# Patient Record
Sex: FEMALE | Race: WHITE | NOT HISPANIC OR LATINO | Employment: OTHER | URBAN - METROPOLITAN AREA
[De-identification: names, ages, dates, MRNs, and addresses within clinical notes are randomized per-mention and may not be internally consistent; named-entity substitution may affect disease eponyms.]

---

## 2022-02-24 ENCOUNTER — APPOINTMENT (OUTPATIENT)
Dept: GENERAL RADIOLOGY | Facility: CLINIC | Age: 72
DRG: 516 | End: 2022-02-24
Attending: EMERGENCY MEDICINE
Payer: MEDICARE

## 2022-02-24 ENCOUNTER — APPOINTMENT (OUTPATIENT)
Dept: CT IMAGING | Facility: CLINIC | Age: 72
DRG: 516 | End: 2022-02-24
Attending: EMERGENCY MEDICINE
Payer: MEDICARE

## 2022-02-24 ENCOUNTER — HOSPITAL ENCOUNTER (INPATIENT)
Facility: CLINIC | Age: 72
LOS: 4 days | Discharge: HOME HEALTH | DRG: 516 | End: 2022-02-28
Attending: EMERGENCY MEDICINE | Admitting: INTERNAL MEDICINE
Payer: MEDICARE

## 2022-02-24 DIAGNOSIS — S82.001A CLOSED DISPLACED FRACTURE OF RIGHT PATELLA, UNSPECIFIED FRACTURE MORPHOLOGY, INITIAL ENCOUNTER: ICD-10-CM
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
## 2022-02-24 DIAGNOSIS — S82.031A CLOSED DISPLACED TRANSVERSE FRACTURE OF RIGHT PATELLA, INITIAL ENCOUNTER: Primary | ICD-10-CM

## 2022-02-24 DIAGNOSIS — S43.004A SHOULDER DISLOCATION, RIGHT, INITIAL ENCOUNTER: ICD-10-CM

## 2022-02-24 DIAGNOSIS — S09.90XA CLOSED HEAD INJURY, INITIAL ENCOUNTER: ICD-10-CM

## 2022-02-24 LAB
ANION GAP SERPL CALCULATED.3IONS-SCNC: 5 MMOL/L (ref 3–14)
BASOPHILS # BLD AUTO: 0.1 10E3/UL (ref 0–0.2)
BASOPHILS NFR BLD AUTO: 0 %
BUN SERPL-MCNC: 18 MG/DL (ref 7–30)
CALCIUM SERPL-MCNC: 8.3 MG/DL (ref 8.5–10.1)
CHLORIDE BLD-SCNC: 107 MMOL/L (ref 94–109)
CO2 SERPL-SCNC: 23 MMOL/L (ref 20–32)
CREAT SERPL-MCNC: 0.63 MG/DL (ref 0.52–1.04)
EOSINOPHIL # BLD AUTO: 0 10E3/UL (ref 0–0.7)
EOSINOPHIL NFR BLD AUTO: 0 %
ERYTHROCYTE [DISTWIDTH] IN BLOOD BY AUTOMATED COUNT: 11.9 % (ref 10–15)
GFR SERPL CREATININE-BSD FRML MDRD: >90 ML/MIN/1.73M2
GLUCOSE BLD-MCNC: 183 MG/DL (ref 70–99)
HCT VFR BLD AUTO: 36.8 % (ref 35–47)
HGB BLD-MCNC: 12.5 G/DL (ref 11.7–15.7)
IMM GRANULOCYTES # BLD: 0.1 10E3/UL
IMM GRANULOCYTES NFR BLD: 1 %
LYMPHOCYTES # BLD AUTO: 1.2 10E3/UL (ref 0.8–5.3)
LYMPHOCYTES NFR BLD AUTO: 8 %
MCH RBC QN AUTO: 30.9 PG (ref 26.5–33)
MCHC RBC AUTO-ENTMCNC: 34 G/DL (ref 31.5–36.5)
MCV RBC AUTO: 91 FL (ref 78–100)
MONOCYTES # BLD AUTO: 0.5 10E3/UL (ref 0–1.3)
MONOCYTES NFR BLD AUTO: 3 %
NEUTROPHILS # BLD AUTO: 13.1 10E3/UL (ref 1.6–8.3)
NEUTROPHILS NFR BLD AUTO: 88 %
NRBC # BLD AUTO: 0 10E3/UL
NRBC BLD AUTO-RTO: 0 /100
PLATELET # BLD AUTO: 233 10E3/UL (ref 150–450)
POTASSIUM BLD-SCNC: 3.6 MMOL/L (ref 3.4–5.3)
RBC # BLD AUTO: 4.05 10E6/UL (ref 3.8–5.2)
SARS-COV-2 RNA RESP QL NAA+PROBE: NEGATIVE
SODIUM SERPL-SCNC: 135 MMOL/L (ref 133–144)
WBC # BLD AUTO: 15 10E3/UL (ref 4–11)

## 2022-02-24 PROCEDURE — G1004 CDSM NDSC: HCPCS

## 2022-02-24 PROCEDURE — 99152 MOD SED SAME PHYS/QHP 5/>YRS: CPT

## 2022-02-24 PROCEDURE — 96374 THER/PROPH/DIAG INJ IV PUSH: CPT

## 2022-02-24 PROCEDURE — 85004 AUTOMATED DIFF WBC COUNT: CPT | Performed by: EMERGENCY MEDICINE

## 2022-02-24 PROCEDURE — 36415 COLL VENOUS BLD VENIPUNCTURE: CPT | Performed by: EMERGENCY MEDICINE

## 2022-02-24 PROCEDURE — 999N000157 HC STATISTIC RCP TIME EA 10 MIN

## 2022-02-24 PROCEDURE — 999N000065 XR SHOULDER RIGHT PORT 1 VIEW: Mod: RT

## 2022-02-24 PROCEDURE — 120N000001 HC R&B MED SURG/OB

## 2022-02-24 PROCEDURE — C9803 HOPD COVID-19 SPEC COLLECT: HCPCS

## 2022-02-24 PROCEDURE — 99207 PR CDG-CODE CATEGORY CHANGED: CPT | Performed by: INTERNAL MEDICINE

## 2022-02-24 PROCEDURE — 96376 TX/PRO/DX INJ SAME DRUG ADON: CPT

## 2022-02-24 PROCEDURE — 27520 TREAT KNEECAP FRACTURE: CPT | Mod: RT

## 2022-02-24 PROCEDURE — 73562 X-RAY EXAM OF KNEE 3: CPT | Mod: RT

## 2022-02-24 PROCEDURE — 87635 SARS-COV-2 COVID-19 AMP PRB: CPT | Performed by: EMERGENCY MEDICINE

## 2022-02-24 PROCEDURE — 99285 EMERGENCY DEPT VISIT HI MDM: CPT | Mod: 25

## 2022-02-24 PROCEDURE — 99223 1ST HOSP IP/OBS HIGH 75: CPT | Mod: AI | Performed by: INTERNAL MEDICINE

## 2022-02-24 PROCEDURE — 250N000011 HC RX IP 250 OP 636: Performed by: EMERGENCY MEDICINE

## 2022-02-24 PROCEDURE — 999N000065 XR SHOULDER RIGHT PORT G/E 2 VIEWS: Mod: RT

## 2022-02-24 PROCEDURE — 82310 ASSAY OF CALCIUM: CPT | Performed by: EMERGENCY MEDICINE

## 2022-02-24 PROCEDURE — 73502 X-RAY EXAM HIP UNI 2-3 VIEWS: CPT

## 2022-02-24 PROCEDURE — 96375 TX/PRO/DX INJ NEW DRUG ADDON: CPT

## 2022-02-24 PROCEDURE — 73030 X-RAY EXAM OF SHOULDER: CPT | Mod: RT

## 2022-02-24 RX ORDER — PROPOFOL 10 MG/ML
INJECTION, EMULSION INTRAVENOUS
Status: DISCONTINUED
Start: 2022-02-24 | End: 2022-02-24 | Stop reason: HOSPADM

## 2022-02-24 RX ORDER — MORPHINE SULFATE 2 MG/ML
2 INJECTION, SOLUTION INTRAMUSCULAR; INTRAVENOUS ONCE
Status: COMPLETED | OUTPATIENT
Start: 2022-02-24 | End: 2022-02-24

## 2022-02-24 RX ORDER — ESCITALOPRAM OXALATE 20 MG/1
20 TABLET ORAL DAILY
COMMUNITY
Start: 2021-11-24

## 2022-02-24 RX ORDER — ALPRAZOLAM 0.25 MG
0.25 TABLET ORAL
COMMUNITY
Start: 2021-05-07

## 2022-02-24 RX ORDER — ONDANSETRON 2 MG/ML
4 INJECTION INTRAMUSCULAR; INTRAVENOUS EVERY 30 MIN PRN
Status: DISCONTINUED | OUTPATIENT
Start: 2022-02-24 | End: 2022-02-25

## 2022-02-24 RX ORDER — LOSARTAN POTASSIUM 25 MG/1
25 TABLET ORAL DAILY
Status: ON HOLD | COMMUNITY
Start: 2022-01-31 | End: 2022-02-27

## 2022-02-24 RX ORDER — MULTIPLE VITAMINS W/ MINERALS TAB 9MG-400MCG
1 TAB ORAL DAILY
COMMUNITY

## 2022-02-24 RX ORDER — MORPHINE SULFATE 4 MG/ML
4 INJECTION, SOLUTION INTRAMUSCULAR; INTRAVENOUS ONCE
Status: COMPLETED | OUTPATIENT
Start: 2022-02-24 | End: 2022-02-24

## 2022-02-24 RX ORDER — PROPOFOL 10 MG/ML
INJECTION, EMULSION INTRAVENOUS DAILY PRN
Status: COMPLETED | OUTPATIENT
Start: 2022-02-24 | End: 2022-02-24

## 2022-02-24 RX ORDER — AMLODIPINE BESYLATE 10 MG/1
10 TABLET ORAL DAILY
COMMUNITY
Start: 2021-12-31

## 2022-02-24 RX ORDER — ONDANSETRON 2 MG/ML
4 INJECTION INTRAMUSCULAR; INTRAVENOUS ONCE
Status: COMPLETED | OUTPATIENT
Start: 2022-02-24 | End: 2022-02-24

## 2022-02-24 RX ORDER — VITAMIN E 268 MG
400 CAPSULE ORAL DAILY
COMMUNITY

## 2022-02-24 RX ORDER — LANOLIN ALCOHOL/MO/W.PET/CERES
400 CREAM (GRAM) TOPICAL DAILY
COMMUNITY

## 2022-02-24 RX ORDER — NAPROXEN 250 MG/1
250 TABLET ORAL 2 TIMES DAILY PRN
COMMUNITY

## 2022-02-24 RX ORDER — FAMOTIDINE 20 MG/1
20 TABLET, FILM COATED ORAL 2 TIMES DAILY PRN
COMMUNITY
Start: 2021-05-07

## 2022-02-24 RX ORDER — CHOLECALCIFEROL (VITAMIN D3) 50 MCG
1 TABLET ORAL DAILY
COMMUNITY

## 2022-02-24 RX ADMIN — ONDANSETRON 4 MG: 2 INJECTION INTRAMUSCULAR; INTRAVENOUS at 20:45

## 2022-02-24 RX ADMIN — PROPOFOL 20 MG: 10 INJECTION, EMULSION INTRAVENOUS at 22:11

## 2022-02-24 RX ADMIN — MORPHINE SULFATE 2 MG: 2 INJECTION, SOLUTION INTRAMUSCULAR; INTRAVENOUS at 19:30

## 2022-02-24 RX ADMIN — MORPHINE SULFATE 4 MG: 4 INJECTION, SOLUTION INTRAMUSCULAR; INTRAVENOUS at 21:14

## 2022-02-24 RX ADMIN — PROPOFOL 20 MG: 10 INJECTION, EMULSION INTRAVENOUS at 22:10

## 2022-02-24 RX ADMIN — ONDANSETRON 4 MG: 2 INJECTION INTRAMUSCULAR; INTRAVENOUS at 19:30

## 2022-02-24 RX ADMIN — PROPOFOL 30 MG: 10 INJECTION, EMULSION INTRAVENOUS at 22:09

## 2022-02-24 RX ADMIN — PROPOFOL 20 MG: 10 INJECTION, EMULSION INTRAVENOUS at 22:13

## 2022-02-24 ASSESSMENT — ENCOUNTER SYMPTOMS
NECK PAIN: 0
BACK PAIN: 0
ARTHRALGIAS: 1

## 2022-02-24 ASSESSMENT — ACTIVITIES OF DAILY LIVING (ADL)
ADLS_ACUITY_SCORE: 16
ADLS_ACUITY_SCORE: 16

## 2022-02-24 NOTE — LETTER
Transition Communication Hand-off for Care Transitions to Next Level of Care Provider    Name: Ericka Frias  : 1950  MRN #: [id number]  Primary Care Provider: Adriana Jeff     Primary Clinic: [address] [facility] [address]  [place] [address]     Reason for Hospitalization:  Closed head injury, initial encounter [S09.90XA]  Fall, initial encounter [W19.XXXA]  Closed displaced transverse fracture of right patella, initial encounter [S82.031A]  Shoulder dislocation, right, initial encounter [S43.004A]  Closed displaced fracture of right patella, unspecified fracture morphology, initial encounter [S82.001A]  Admit Date/Time: 2022  6:53 PM  Discharge Date: 22  Payor Source: Payor: MEDICARE / Plan: MEDICARE / Product Type: Medicare /              Reason for Communication Hand-off Referral: Other Hospital discharge with home physical therapy    Discharge Plan:       Concern for non-adherence with plan of care:   Y/N no  Discharge Needs Assessment:  Needs      Most Recent Value   Equipment Currently Used at Home none  [access to a wheelchair]   # of Referrals Placed by [facility]          Already enrolled in Tele-monitoring program and name of program:  no  Follow-up specialty is recommended: No    Follow-up plan:    Future Appointments   Date Time Provider Department Center   2022  9:45 AM Melivn, Rocio E, [facility]       Any outstanding tests or procedures:        Referrals     Future Labs/Procedures    Home Care Referral     Comments:    Your provider has ordered home health services. HOMECARE NOTE:   Your doctor has ordered home care, Physical Therapy to help you after your hospital stay.  The staff will contact you to schedule your first visit.  This service will be provided by [facility].  If you have questions, or have not received a call within 48 hours of discharge, please call [facility] at [phone].          Supplies     Future Labs/Procedures    [facility]     Process  Instructions:    By signing this order, the Authorizing Provider is attesting that they have completed a face-to-face evaluation on the patient to determine their need for this equipment in the last 60 days. A new face-to-face evaluation is required each time  A new prescription for one of the specified items is ordered.   If an additional provider completed the evaluation, please indicate their name below.     **As of 2018, an order requisition and face sheet will print for all DME orders. Please give printed order and face sheet to patient if not obtaining product from [facility] DME closet.     Comments:    DME Documentation: Describe the reason for need to support medical necessity: Impaired gait status post knee surgery. I, the undersigned, certify that the above prescribed supplies are medically necessary for this patient and is both reasonable and necessary in reference to accepted standards of medical practice in the treatment of this patient's condition and is not prescribed as a convenience.    [facility]     Process Instructions:    By signing this order, the Authorizing Provider is attesting that they have completed a face-to-face evaluation on the patient to determine their need for this equipment in the last 60 days. A new face-to-face evaluation is required each time  A new prescription for one of the specified items is ordered.   If an additional provider completed the evaluation, please indicate their name below.     **As of 2018, an order requisition and face sheet will print for all DME orders. Please give printed order and face sheet to patient if not obtaining product from [facility] DME closet.     Comments:    DME Documentation: Describe the reason for need to support medical necessity: Impaired gait status post knee surgery. I, the undersigned, certify that the above prescribed supplies are medically necessary for this patient and is both reasonable and necessary in  reference to accepted standards of medical practice in the treatment of this patient's condition and is not prescribed as a convenience.    [facility]     Process Instructions:    By signing this order, the Authorizing Provider is attesting that they have completed a face-to-face evaluation on the patient to determine their need for this equipment in the last 60 days. A new face-to-face evaluation is required each time  A new prescription for one of the specified items is ordered.   If an additional provider completed the evaluation, please indicate their name below.     **As of 2018, an order requisition and face sheet will print for all DME orders. Please give printed order and face sheet to patient if not obtaining product from [facility] DME closet.     Comments:    DME Documentation: Describe the reason for need to support medical necessity: Impaired gait status post knee surgery. I, the undersigned, certify that the above prescribed supplies are medically necessary for this patient and is both reasonable and necessary in reference to accepted standards of medical practice in the treatment of this patient's condition and is not prescribed as a convenience.          Key Recommendations:      Gwen Alegria RN    AVS/Discharge Summary is the source of truth; this is a helpful guide for improved communication of patient story

## 2022-02-25 ENCOUNTER — APPOINTMENT (OUTPATIENT)
Dept: GENERAL RADIOLOGY | Facility: CLINIC | Age: 72
DRG: 516 | End: 2022-02-25
Attending: ORTHOPAEDIC SURGERY
Payer: MEDICARE

## 2022-02-25 ENCOUNTER — ANESTHESIA (OUTPATIENT)
Dept: SURGERY | Facility: CLINIC | Age: 72
DRG: 516 | End: 2022-02-25
Payer: MEDICARE

## 2022-02-25 ENCOUNTER — ANESTHESIA EVENT (OUTPATIENT)
Dept: SURGERY | Facility: CLINIC | Age: 72
DRG: 516 | End: 2022-02-25
Payer: MEDICARE

## 2022-02-25 LAB
ALBUMIN UR-MCNC: NEGATIVE MG/DL
APPEARANCE UR: CLEAR
BILIRUB UR QL STRIP: NEGATIVE
COLOR UR AUTO: ABNORMAL
GLUCOSE UR STRIP-MCNC: NEGATIVE MG/DL
HGB UR QL STRIP: NEGATIVE
KETONES UR STRIP-MCNC: ABNORMAL MG/DL
LEUKOCYTE ESTERASE UR QL STRIP: NEGATIVE
NITRATE UR QL: NEGATIVE
PH UR STRIP: 6.5 [PH] (ref 5–7)
SP GR UR STRIP: 1.02 (ref 1–1.03)
UROBILINOGEN UR STRIP-MCNC: NORMAL MG/DL

## 2022-02-25 PROCEDURE — 370N000017 HC ANESTHESIA TECHNICAL FEE, PER MIN: Performed by: ORTHOPAEDIC SURGERY

## 2022-02-25 PROCEDURE — 710N000009 HC RECOVERY PHASE 1, LEVEL 1, PER MIN: Performed by: ORTHOPAEDIC SURGERY

## 2022-02-25 PROCEDURE — 250N000013 HC RX MED GY IP 250 OP 250 PS 637: Performed by: PHYSICIAN ASSISTANT

## 2022-02-25 PROCEDURE — 250N000009 HC RX 250: Performed by: NURSE ANESTHETIST, CERTIFIED REGISTERED

## 2022-02-25 PROCEDURE — 271N000001 HC OR GENERAL SUPPLY NON-STERILE: Performed by: ORTHOPAEDIC SURGERY

## 2022-02-25 PROCEDURE — 250N000025 HC SEVOFLURANE, PER MIN: Performed by: ORTHOPAEDIC SURGERY

## 2022-02-25 PROCEDURE — 93005 ELECTROCARDIOGRAM TRACING: CPT

## 2022-02-25 PROCEDURE — 250N000009 HC RX 250: Performed by: ORTHOPAEDIC SURGERY

## 2022-02-25 PROCEDURE — 250N000013 HC RX MED GY IP 250 OP 250 PS 637: Performed by: INTERNAL MEDICINE

## 2022-02-25 PROCEDURE — 258N000003 HC RX IP 258 OP 636: Performed by: PHYSICIAN ASSISTANT

## 2022-02-25 PROCEDURE — 250N000011 HC RX IP 250 OP 636: Performed by: NURSE ANESTHETIST, CERTIFIED REGISTERED

## 2022-02-25 PROCEDURE — 120N000001 HC R&B MED SURG/OB

## 2022-02-25 PROCEDURE — 99232 SBSQ HOSP IP/OBS MODERATE 35: CPT | Performed by: HOSPITALIST

## 2022-02-25 PROCEDURE — 250N000011 HC RX IP 250 OP 636: Performed by: INTERNAL MEDICINE

## 2022-02-25 PROCEDURE — 999N000179 XR SURGERY CARM FLUORO LESS THAN 5 MIN W STILLS

## 2022-02-25 PROCEDURE — 258N000003 HC RX IP 258 OP 636: Performed by: INTERNAL MEDICINE

## 2022-02-25 PROCEDURE — 250N000009 HC RX 250: Performed by: ANESTHESIOLOGY

## 2022-02-25 PROCEDURE — 250N000011 HC RX IP 250 OP 636: Performed by: ORTHOPAEDIC SURGERY

## 2022-02-25 PROCEDURE — 999N000141 HC STATISTIC PRE-PROCEDURE NURSING ASSESSMENT: Performed by: ORTHOPAEDIC SURGERY

## 2022-02-25 PROCEDURE — 250N000011 HC RX IP 250 OP 636: Performed by: ANESTHESIOLOGY

## 2022-02-25 PROCEDURE — 0QSD04Z REPOSITION RIGHT PATELLA WITH INTERNAL FIXATION DEVICE, OPEN APPROACH: ICD-10-PCS | Performed by: ORTHOPAEDIC SURGERY

## 2022-02-25 PROCEDURE — 272N000001 HC OR GENERAL SUPPLY STERILE: Performed by: ORTHOPAEDIC SURGERY

## 2022-02-25 PROCEDURE — 360N000084 HC SURGERY LEVEL 4 W/ FLUORO, PER MIN: Performed by: ORTHOPAEDIC SURGERY

## 2022-02-25 PROCEDURE — 81003 URINALYSIS AUTO W/O SCOPE: CPT | Performed by: INTERNAL MEDICINE

## 2022-02-25 PROCEDURE — C1769 GUIDE WIRE: HCPCS | Performed by: ORTHOPAEDIC SURGERY

## 2022-02-25 PROCEDURE — C1713 ANCHOR/SCREW BN/BN,TIS/BN: HCPCS | Performed by: ORTHOPAEDIC SURGERY

## 2022-02-25 DEVICE — IMP SCR SYN CAN 4.0X40MM SHORT SS 207.640: Type: IMPLANTABLE DEVICE | Site: KNEE | Status: FUNCTIONAL

## 2022-02-25 RX ORDER — ONDANSETRON 2 MG/ML
INJECTION INTRAMUSCULAR; INTRAVENOUS PRN
Status: DISCONTINUED | OUTPATIENT
Start: 2022-02-25 | End: 2022-02-25

## 2022-02-25 RX ORDER — BISACODYL 10 MG
10 SUPPOSITORY, RECTAL RECTAL DAILY PRN
Status: DISCONTINUED | OUTPATIENT
Start: 2022-02-25 | End: 2022-02-28 | Stop reason: HOSPADM

## 2022-02-25 RX ORDER — CEFAZOLIN SODIUM/WATER 2 G/20 ML
2 SYRINGE (ML) INTRAVENOUS SEE ADMIN INSTRUCTIONS
Status: DISCONTINUED | OUTPATIENT
Start: 2022-02-25 | End: 2022-02-25

## 2022-02-25 RX ORDER — MULTIPLE VITAMINS W/ MINERALS TAB 9MG-400MCG
1 TAB ORAL DAILY
Status: DISCONTINUED | OUTPATIENT
Start: 2022-02-25 | End: 2022-02-28 | Stop reason: HOSPADM

## 2022-02-25 RX ORDER — SODIUM CHLORIDE, SODIUM LACTATE, POTASSIUM CHLORIDE, CALCIUM CHLORIDE 600; 310; 30; 20 MG/100ML; MG/100ML; MG/100ML; MG/100ML
INJECTION, SOLUTION INTRAVENOUS CONTINUOUS
Status: DISCONTINUED | OUTPATIENT
Start: 2022-02-25 | End: 2022-02-25

## 2022-02-25 RX ORDER — HYDROXYZINE HYDROCHLORIDE 10 MG/1
10 TABLET, FILM COATED ORAL EVERY 6 HOURS PRN
Status: DISCONTINUED | OUTPATIENT
Start: 2022-02-25 | End: 2022-02-28 | Stop reason: HOSPADM

## 2022-02-25 RX ORDER — PROCHLORPERAZINE MALEATE 5 MG
5 TABLET ORAL EVERY 6 HOURS PRN
Status: DISCONTINUED | OUTPATIENT
Start: 2022-02-25 | End: 2022-02-28 | Stop reason: HOSPADM

## 2022-02-25 RX ORDER — NALOXONE HYDROCHLORIDE 0.4 MG/ML
0.4 INJECTION, SOLUTION INTRAMUSCULAR; INTRAVENOUS; SUBCUTANEOUS
Status: DISCONTINUED | OUTPATIENT
Start: 2022-02-25 | End: 2022-02-28 | Stop reason: HOSPADM

## 2022-02-25 RX ORDER — ACETAMINOPHEN 325 MG/1
975 TABLET ORAL EVERY 8 HOURS
Status: DISCONTINUED | OUTPATIENT
Start: 2022-02-25 | End: 2022-02-28 | Stop reason: HOSPADM

## 2022-02-25 RX ORDER — OXYCODONE HYDROCHLORIDE 5 MG/1
5 TABLET ORAL EVERY 4 HOURS PRN
Status: DISCONTINUED | OUTPATIENT
Start: 2022-02-25 | End: 2022-02-28 | Stop reason: HOSPADM

## 2022-02-25 RX ORDER — ALPRAZOLAM 0.25 MG
0.25 TABLET ORAL
Status: DISCONTINUED | OUTPATIENT
Start: 2022-02-25 | End: 2022-02-28 | Stop reason: HOSPADM

## 2022-02-25 RX ORDER — ACETAMINOPHEN 325 MG/1
650 TABLET ORAL EVERY 4 HOURS PRN
Status: DISCONTINUED | OUTPATIENT
Start: 2022-02-28 | End: 2022-02-25

## 2022-02-25 RX ORDER — MAGNESIUM HYDROXIDE 1200 MG/15ML
LIQUID ORAL PRN
Status: DISCONTINUED | OUTPATIENT
Start: 2022-02-25 | End: 2022-02-25 | Stop reason: HOSPADM

## 2022-02-25 RX ORDER — NALOXONE HYDROCHLORIDE 0.4 MG/ML
0.2 INJECTION, SOLUTION INTRAMUSCULAR; INTRAVENOUS; SUBCUTANEOUS
Status: DISCONTINUED | OUTPATIENT
Start: 2022-02-25 | End: 2022-02-28 | Stop reason: HOSPADM

## 2022-02-25 RX ORDER — IBUPROFEN 600 MG/1
600 TABLET, FILM COATED ORAL EVERY 6 HOURS PRN
Status: DISCONTINUED | OUTPATIENT
Start: 2022-02-25 | End: 2022-02-28 | Stop reason: HOSPADM

## 2022-02-25 RX ORDER — ESCITALOPRAM OXALATE 20 MG/1
20 TABLET ORAL DAILY
Status: DISCONTINUED | OUTPATIENT
Start: 2022-02-25 | End: 2022-02-28 | Stop reason: HOSPADM

## 2022-02-25 RX ORDER — HYDROMORPHONE HYDROCHLORIDE 1 MG/ML
.3-.5 INJECTION, SOLUTION INTRAMUSCULAR; INTRAVENOUS; SUBCUTANEOUS
Status: DISCONTINUED | OUTPATIENT
Start: 2022-02-25 | End: 2022-02-25

## 2022-02-25 RX ORDER — ONDANSETRON 2 MG/ML
4 INJECTION INTRAMUSCULAR; INTRAVENOUS EVERY 6 HOURS PRN
Status: DISCONTINUED | OUTPATIENT
Start: 2022-02-25 | End: 2022-02-28 | Stop reason: HOSPADM

## 2022-02-25 RX ORDER — AMLODIPINE BESYLATE 10 MG/1
10 TABLET ORAL DAILY
Status: DISCONTINUED | OUTPATIENT
Start: 2022-02-25 | End: 2022-02-28 | Stop reason: HOSPADM

## 2022-02-25 RX ORDER — OXYCODONE HYDROCHLORIDE 5 MG/1
10 TABLET ORAL EVERY 4 HOURS PRN
Status: DISCONTINUED | OUTPATIENT
Start: 2022-02-25 | End: 2022-02-28 | Stop reason: HOSPADM

## 2022-02-25 RX ORDER — LABETALOL HYDROCHLORIDE 5 MG/ML
10 INJECTION, SOLUTION INTRAVENOUS
Status: DISCONTINUED | OUTPATIENT
Start: 2022-02-25 | End: 2022-02-28 | Stop reason: HOSPADM

## 2022-02-25 RX ORDER — CEFAZOLIN SODIUM 1 G/3ML
1 INJECTION, POWDER, FOR SOLUTION INTRAMUSCULAR; INTRAVENOUS EVERY 8 HOURS
Status: COMPLETED | OUTPATIENT
Start: 2022-02-26 | End: 2022-02-26

## 2022-02-25 RX ORDER — LIDOCAINE HYDROCHLORIDE 20 MG/ML
INJECTION, SOLUTION INFILTRATION; PERINEURAL PRN
Status: DISCONTINUED | OUTPATIENT
Start: 2022-02-25 | End: 2022-02-25

## 2022-02-25 RX ORDER — ONDANSETRON 2 MG/ML
4 INJECTION INTRAMUSCULAR; INTRAVENOUS EVERY 6 HOURS PRN
Status: DISCONTINUED | OUTPATIENT
Start: 2022-02-25 | End: 2022-02-25

## 2022-02-25 RX ORDER — DEXAMETHASONE SODIUM PHOSPHATE 4 MG/ML
INJECTION, SOLUTION INTRA-ARTICULAR; INTRALESIONAL; INTRAMUSCULAR; INTRAVENOUS; SOFT TISSUE PRN
Status: DISCONTINUED | OUTPATIENT
Start: 2022-02-25 | End: 2022-02-25

## 2022-02-25 RX ORDER — AMOXICILLIN 250 MG
1 CAPSULE ORAL 2 TIMES DAILY
Status: DISCONTINUED | OUTPATIENT
Start: 2022-02-25 | End: 2022-02-28 | Stop reason: HOSPADM

## 2022-02-25 RX ORDER — LIDOCAINE 40 MG/G
CREAM TOPICAL
Status: DISCONTINUED | OUTPATIENT
Start: 2022-02-25 | End: 2022-02-25

## 2022-02-25 RX ORDER — PROPOFOL 10 MG/ML
INJECTION, EMULSION INTRAVENOUS CONTINUOUS PRN
Status: DISCONTINUED | OUTPATIENT
Start: 2022-02-25 | End: 2022-02-25

## 2022-02-25 RX ORDER — HYDROMORPHONE HCL IN WATER/PF 6 MG/30 ML
0.2 PATIENT CONTROLLED ANALGESIA SYRINGE INTRAVENOUS
Status: DISCONTINUED | OUTPATIENT
Start: 2022-02-25 | End: 2022-02-28 | Stop reason: HOSPADM

## 2022-02-25 RX ORDER — AMOXICILLIN 250 MG
1 CAPSULE ORAL 2 TIMES DAILY PRN
Status: DISCONTINUED | OUTPATIENT
Start: 2022-02-25 | End: 2022-02-28 | Stop reason: HOSPADM

## 2022-02-25 RX ORDER — ONDANSETRON 2 MG/ML
4 INJECTION INTRAMUSCULAR; INTRAVENOUS EVERY 30 MIN PRN
Status: DISCONTINUED | OUTPATIENT
Start: 2022-02-25 | End: 2022-02-25 | Stop reason: HOSPADM

## 2022-02-25 RX ORDER — OXYCODONE HYDROCHLORIDE 5 MG/1
5 TABLET ORAL EVERY 4 HOURS PRN
Status: DISCONTINUED | OUTPATIENT
Start: 2022-02-25 | End: 2022-02-25 | Stop reason: HOSPADM

## 2022-02-25 RX ORDER — ACETAMINOPHEN 325 MG/1
650 TABLET ORAL EVERY 6 HOURS PRN
Status: DISCONTINUED | OUTPATIENT
Start: 2022-02-25 | End: 2022-02-28 | Stop reason: HOSPADM

## 2022-02-25 RX ORDER — ONDANSETRON 4 MG/1
4 TABLET, ORALLY DISINTEGRATING ORAL EVERY 6 HOURS PRN
Status: DISCONTINUED | OUTPATIENT
Start: 2022-02-25 | End: 2022-02-28 | Stop reason: HOSPADM

## 2022-02-25 RX ORDER — CHOLECALCIFEROL (VITAMIN D3) 50 MCG
50 TABLET ORAL DAILY
Status: DISCONTINUED | OUTPATIENT
Start: 2022-02-25 | End: 2022-02-28 | Stop reason: HOSPADM

## 2022-02-25 RX ORDER — FAMOTIDINE 20 MG/1
20 TABLET, FILM COATED ORAL 2 TIMES DAILY PRN
Status: DISCONTINUED | OUTPATIENT
Start: 2022-02-25 | End: 2022-02-28 | Stop reason: HOSPADM

## 2022-02-25 RX ORDER — AMOXICILLIN 250 MG
2 CAPSULE ORAL 2 TIMES DAILY PRN
Status: DISCONTINUED | OUTPATIENT
Start: 2022-02-25 | End: 2022-02-25

## 2022-02-25 RX ORDER — LANOLIN ALCOHOL/MO/W.PET/CERES
400 CREAM (GRAM) TOPICAL DAILY
Status: DISCONTINUED | OUTPATIENT
Start: 2022-02-25 | End: 2022-02-28 | Stop reason: HOSPADM

## 2022-02-25 RX ORDER — ACETAMINOPHEN 650 MG/1
650 SUPPOSITORY RECTAL EVERY 6 HOURS PRN
Status: DISCONTINUED | OUTPATIENT
Start: 2022-02-25 | End: 2022-02-28 | Stop reason: HOSPADM

## 2022-02-25 RX ORDER — SODIUM CHLORIDE, SODIUM LACTATE, POTASSIUM CHLORIDE, CALCIUM CHLORIDE 600; 310; 30; 20 MG/100ML; MG/100ML; MG/100ML; MG/100ML
INJECTION, SOLUTION INTRAVENOUS CONTINUOUS
Status: DISCONTINUED | OUTPATIENT
Start: 2022-02-25 | End: 2022-02-25 | Stop reason: HOSPADM

## 2022-02-25 RX ORDER — CEFAZOLIN SODIUM/WATER 2 G/20 ML
2 SYRINGE (ML) INTRAVENOUS
Status: COMPLETED | OUTPATIENT
Start: 2022-02-25 | End: 2022-02-25

## 2022-02-25 RX ORDER — HYDROMORPHONE HCL IN WATER/PF 6 MG/30 ML
0.4 PATIENT CONTROLLED ANALGESIA SYRINGE INTRAVENOUS EVERY 5 MIN PRN
Status: DISCONTINUED | OUTPATIENT
Start: 2022-02-25 | End: 2022-02-25 | Stop reason: HOSPADM

## 2022-02-25 RX ORDER — LIDOCAINE 40 MG/G
CREAM TOPICAL
Status: DISCONTINUED | OUTPATIENT
Start: 2022-02-25 | End: 2022-02-28 | Stop reason: HOSPADM

## 2022-02-25 RX ORDER — FENTANYL CITRATE 50 UG/ML
25 INJECTION, SOLUTION INTRAMUSCULAR; INTRAVENOUS EVERY 5 MIN PRN
Status: DISCONTINUED | OUTPATIENT
Start: 2022-02-25 | End: 2022-02-25 | Stop reason: HOSPADM

## 2022-02-25 RX ORDER — SODIUM CHLORIDE, SODIUM LACTATE, POTASSIUM CHLORIDE, CALCIUM CHLORIDE 600; 310; 30; 20 MG/100ML; MG/100ML; MG/100ML; MG/100ML
INJECTION, SOLUTION INTRAVENOUS CONTINUOUS
Status: DISCONTINUED | OUTPATIENT
Start: 2022-02-25 | End: 2022-02-28 | Stop reason: HOSPADM

## 2022-02-25 RX ORDER — HYDROMORPHONE HCL IN WATER/PF 6 MG/30 ML
0.4 PATIENT CONTROLLED ANALGESIA SYRINGE INTRAVENOUS
Status: DISCONTINUED | OUTPATIENT
Start: 2022-02-25 | End: 2022-02-28 | Stop reason: HOSPADM

## 2022-02-25 RX ORDER — LABETALOL HYDROCHLORIDE 5 MG/ML
10 INJECTION, SOLUTION INTRAVENOUS
Status: DISCONTINUED | OUTPATIENT
Start: 2022-02-25 | End: 2022-02-25

## 2022-02-25 RX ORDER — ONDANSETRON 4 MG/1
4 TABLET, ORALLY DISINTEGRATING ORAL EVERY 30 MIN PRN
Status: DISCONTINUED | OUTPATIENT
Start: 2022-02-25 | End: 2022-02-25 | Stop reason: HOSPADM

## 2022-02-25 RX ORDER — SODIUM CHLORIDE 9 MG/ML
INJECTION, SOLUTION INTRAVENOUS CONTINUOUS
Status: DISCONTINUED | OUTPATIENT
Start: 2022-02-25 | End: 2022-02-28 | Stop reason: HOSPADM

## 2022-02-25 RX ORDER — POLYETHYLENE GLYCOL 3350 17 G/17G
17 POWDER, FOR SOLUTION ORAL DAILY
Status: DISCONTINUED | OUTPATIENT
Start: 2022-02-26 | End: 2022-02-28 | Stop reason: HOSPADM

## 2022-02-25 RX ORDER — PROPOFOL 10 MG/ML
INJECTION, EMULSION INTRAVENOUS PRN
Status: DISCONTINUED | OUTPATIENT
Start: 2022-02-25 | End: 2022-02-25

## 2022-02-25 RX ORDER — HYDRALAZINE HYDROCHLORIDE 20 MG/ML
10 INJECTION INTRAMUSCULAR; INTRAVENOUS EVERY 4 HOURS PRN
Status: DISCONTINUED | OUTPATIENT
Start: 2022-02-25 | End: 2022-02-28 | Stop reason: HOSPADM

## 2022-02-25 RX ORDER — FENTANYL CITRATE 0.05 MG/ML
50 INJECTION, SOLUTION INTRAMUSCULAR; INTRAVENOUS
Status: DISCONTINUED | OUTPATIENT
Start: 2022-02-25 | End: 2022-02-25

## 2022-02-25 RX ORDER — ONDANSETRON 4 MG/1
4 TABLET, ORALLY DISINTEGRATING ORAL EVERY 6 HOURS PRN
Status: DISCONTINUED | OUTPATIENT
Start: 2022-02-25 | End: 2022-02-25

## 2022-02-25 RX ADMIN — HYDROMORPHONE HYDROCHLORIDE 0.3 MG: 1 INJECTION, SOLUTION INTRAMUSCULAR; INTRAVENOUS; SUBCUTANEOUS at 04:01

## 2022-02-25 RX ADMIN — Medication 1 TABLET: at 08:48

## 2022-02-25 RX ADMIN — PROPOFOL 125 MCG/KG/MIN: 10 INJECTION, EMULSION INTRAVENOUS at 17:54

## 2022-02-25 RX ADMIN — HYDROMORPHONE HYDROCHLORIDE 0.5 MG: 1 INJECTION, SOLUTION INTRAMUSCULAR; INTRAVENOUS; SUBCUTANEOUS at 13:00

## 2022-02-25 RX ADMIN — ESCITALOPRAM OXALATE 20 MG: 20 TABLET ORAL at 08:49

## 2022-02-25 RX ADMIN — Medication 2 G: at 17:49

## 2022-02-25 RX ADMIN — HYDROMORPHONE HYDROCHLORIDE 0.5 MG: 1 INJECTION, SOLUTION INTRAMUSCULAR; INTRAVENOUS; SUBCUTANEOUS at 08:54

## 2022-02-25 RX ADMIN — Medication 400 MCG: at 08:47

## 2022-02-25 RX ADMIN — SODIUM CHLORIDE, PRESERVATIVE FREE: 5 INJECTION INTRAVENOUS at 10:50

## 2022-02-25 RX ADMIN — SODIUM CHLORIDE, PRESERVATIVE FREE: 5 INJECTION INTRAVENOUS at 18:15

## 2022-02-25 RX ADMIN — ACETAMINOPHEN 975 MG: 325 TABLET, FILM COATED ORAL at 22:15

## 2022-02-25 RX ADMIN — PROPOFOL 200 MG: 10 INJECTION, EMULSION INTRAVENOUS at 17:52

## 2022-02-25 RX ADMIN — MIDAZOLAM 1 MG: 1 INJECTION INTRAMUSCULAR; INTRAVENOUS at 17:30

## 2022-02-25 RX ADMIN — SODIUM CHLORIDE, PRESERVATIVE FREE: 5 INJECTION INTRAVENOUS at 00:51

## 2022-02-25 RX ADMIN — AMLODIPINE BESYLATE 10 MG: 10 TABLET ORAL at 08:50

## 2022-02-25 RX ADMIN — SENNOSIDES AND DOCUSATE SODIUM 1 TABLET: 50; 8.6 TABLET ORAL at 21:17

## 2022-02-25 RX ADMIN — HYDROMORPHONE HYDROCHLORIDE 0.2 MG: 0.2 INJECTION, SOLUTION INTRAMUSCULAR; INTRAVENOUS; SUBCUTANEOUS at 19:25

## 2022-02-25 RX ADMIN — SODIUM CHLORIDE, POTASSIUM CHLORIDE, SODIUM LACTATE AND CALCIUM CHLORIDE: 600; 310; 30; 20 INJECTION, SOLUTION INTRAVENOUS at 21:19

## 2022-02-25 RX ADMIN — DEXAMETHASONE SODIUM PHOSPHATE 4 MG: 4 INJECTION, SOLUTION INTRA-ARTICULAR; INTRALESIONAL; INTRAMUSCULAR; INTRAVENOUS; SOFT TISSUE at 18:07

## 2022-02-25 RX ADMIN — Medication 400 UNITS: at 08:49

## 2022-02-25 RX ADMIN — MIDAZOLAM 1 MG: 1 INJECTION INTRAMUSCULAR; INTRAVENOUS at 17:47

## 2022-02-25 RX ADMIN — ONDANSETRON 4 MG: 2 INJECTION INTRAMUSCULAR; INTRAVENOUS at 18:07

## 2022-02-25 RX ADMIN — Medication 20 ML: at 17:43

## 2022-02-25 RX ADMIN — Medication 50 MCG: at 08:49

## 2022-02-25 RX ADMIN — LIDOCAINE HYDROCHLORIDE 40 MG: 20 INJECTION, SOLUTION INFILTRATION; PERINEURAL at 17:52

## 2022-02-25 ASSESSMENT — ACTIVITIES OF DAILY LIVING (ADL)
ADLS_ACUITY_SCORE: 16
ADLS_ACUITY_SCORE: 12
ADLS_ACUITY_SCORE: 18
ADLS_ACUITY_SCORE: 14
ADLS_ACUITY_SCORE: 12
ADLS_ACUITY_SCORE: 18
ADLS_ACUITY_SCORE: 12
ADLS_ACUITY_SCORE: 14
ADLS_ACUITY_SCORE: 18
ADLS_ACUITY_SCORE: 12
ADLS_ACUITY_SCORE: 18
ADLS_ACUITY_SCORE: 18
ADLS_ACUITY_SCORE: 12
ADLS_ACUITY_SCORE: 18
ADLS_ACUITY_SCORE: 14
ADLS_ACUITY_SCORE: 18

## 2022-02-25 NOTE — PHARMACY-ADMISSION MEDICATION HISTORY
Pharmacy Medication History  Admission medication history interview status for the 2/24/2022  admission is complete. See EPIC admission navigator for prior to admission medications     Location of Interview: Patient room  Medication history sources: Patient, Surescripts and Care Everywhere    Significant changes made to the medication list:  Added all meds to list    In the past week, patient estimated taking medication this percent of the time: greater than 90%    Additional medication history information:   None    Medication reconciliation completed by provider prior to medication history? No    Time spent in this activity: 15 min     Prior to Admission medications    Medication Sig Last Dose Taking? Auth Provider   ALPRAZolam (XANAX) 0.25 MG tablet Take 0.25 mg by mouth nightly as needed Past Month at Unknown time Yes Unknown, Entered By History   amLODIPine (NORVASC) 10 MG tablet Take 10 mg by mouth daily 2/24/2022 at Unknown time Yes Unknown, Entered By History   calcium-vitamin D (OSCAL) 250-125 MG-UNIT TABS per tablet Take 1 tablet by mouth daily 2/24/2022 at Unknown time Yes Unknown, Entered By History   escitalopram (LEXAPRO) 20 MG tablet Take 20 mg by mouth daily 2/24/2022 at Unknown time Yes Unknown, Entered By History   famotidine (PEPCID) 20 MG tablet Take 20 mg by mouth 2 times daily as needed for heartburn  at prn Yes Unknown, Entered By History   folic acid (FOLVITE) 400 MCG tablet Take 400 mcg by mouth daily 2/24/2022 at Unknown time Yes Unknown, Entered By History   losartan (COZAAR) 25 MG tablet Take 25 mg by mouth daily 2/24/2022 at Unknown time Yes Unknown, Entered By History   multivitamin w/minerals (THERA-VIT-M) tablet Take 1 tablet by mouth daily 2/24/2022 at Unknown time Yes Unknown, Entered By History   naproxen (NAPROSYN) 250 MG tablet Take 250 mg by mouth 2 times daily as needed for moderate pain 2/24/2022 at Unknown time Yes Unknown, Entered By History   vitamin D3 (CHOLECALCIFEROL) 50  mcg (2000 units) tablet Take 1 tablet by mouth daily 2/24/2022 at Unknown time Yes Unknown, Entered By History   vitamin E (TOCOPHEROL) 400 units (180 mg) capsule Take 400 Units by mouth daily 2/24/2022 at Unknown time Yes Unknown, Entered By History       The information provided in this note is only as accurate as the sources available at the time of update(s)

## 2022-02-25 NOTE — H&P (VIEW-ONLY)
History   Chief Complaint:  Fall, Shoulder Pain, and Knee Pain       The history is provided by the patient and a relative.      Ericka Frias is a 72 year old female with history of hypertension and GERD who presents via EMS for evaluation of right shoulder and knee pain in the setting of mechanical fall that occurred just prior to arrival. The patient reportedly stepped on a metal plate while descending some stairs outside of [place] leading to the parking lot. She landed on her right side, injuring both her knee and her shoulder. Daughter also thinks that patient might have hit the front of her head on one of the cars in the parking lot though is ultmately unsure. No loss of consciousness. She did receive 100 mcg fentanyl from EMS en route. Patient notes history of right knee replacement several years ago and is concerned that something may have happened with this as she has been unable to move her leg since time of fall. The pain is alleviated slightly with lying flat on her back. She denies any pain to her neck, back, or hips.    Review of Systems   Musculoskeletal: Positive for arthralgias (R shoulder, R knee, denies hips) and gait problem (d/t injury). Negative for back pain and neck pain.   Neurological: Negative for syncope.   All other systems reviewed and are negative.    Allergies:  Lisinopril    Medications:  Aspirin 81 mg  Escitalopram  Famotidine  Losartan  Amlodipine  Senna-docusate    Past Medical History:     Arthritis  GERD  Hypertension  Anxiety    Past Surgical History:    Right TKA  Colonoscopy  Abdominoplasty  Sphincterectomy with stone extraction  Cholecystectomy  Cyst removal from R ovary  Pinning of bilateral foot fractures    Family History:    Father - hyperlipidemia, hypertension, neuropathy, bladder cancer  Mother - hyperlipidemia, hypertension, heart failure, psychiatric illness  Sister - diabetes, heart failure    Social History:  The patient was accompanied to the ED by EMS  "and her daughter.  Marital status:     Physical Exam     Patient Vitals for the past 24 hrs:   BP Temp Temp src Pulse Resp SpO2 Height Weight   02/24/22 2230 (!) 150/60 -- -- 82 24 100 % -- --   02/24/22 2220 (!) 150/75 -- -- 75 28 100 % -- --   02/24/22 2215 -- -- -- -- 15 -- -- --   02/24/22 2215 (!) 152/100 -- -- 75 18 96 % -- --   02/24/22 2212 -- -- -- -- 12 -- -- --   02/24/22 2210 -- -- -- -- 14 -- -- --   02/24/22 2210 (!) 170/90 -- -- 77 20 100 % -- --   02/24/22 2202 -- -- -- 82 18 100 % -- --   02/24/22 2100 -- -- -- -- -- 99 % -- --   02/24/22 2030 -- -- -- -- -- 98 % -- --   02/24/22 1900 (!) 150/80 -- -- 74 18 99 % 1.578 m (5' 2.13\") 72.6 kg (160 lb)   02/24/22 1856 (!) 150/80 98.7  F (37.1  C) Oral 75 16 97 % 1.585 m (5' 2.4\") 72.6 kg (160 lb)       Physical Exam  General: Patient is alert and normal appearing.  HEENT: Head contusion to the forehead   Eyes: pupils equal and reactive. Conjunctiva clear   Nares: patent   Oropharynx: no lesions, uvula midline, no palatal draping, normal voice, no trismus  Neck: Supple without lymphadenopathy, no meningismus.  Cleared by [facility]  Chest: Heart regular rate and rhythm.   Lungs: Equal clear to auscultation with no wheeze or rales  Abdomen: Soft, non tender, nondistended, normal bowel sounds  Back: No costovertebral angle tenderness, no midline C, T or L spine tenderness  Neuro: Grossly nonfocal, normal speech, strength equal bilaterally, CN 2-12 intact  Extremities: Right shoulder with obvious deformity and tenderness to palpation, right knee with edema and tenderness, no pelvis tenderness to palpation, feet warm and well-perfused equal, equal radial and DP pulses. No clubbing, cyanosis.  No edema  Skin: Warm and dry with no rash.       Emergency Department Course     Imaging:  XR Shoulder Right Port G/E 2 Views   Preliminary Result   IMPRESSION: Previously seen anteroinferior right shoulder dislocation has been reduced to anatomic alignment.       XR " Shoulder Right Port 1 View   Final Result   IMPRESSION: Unchanged anterior dislocation of the humeral head relative to the glenoid. No definite fracture is identified.      XR Shoulder Right G/E 3 Views   Final Result   IMPRESSION: Anterior dislocation of the right glenohumeral joint. No fractures are identified but repeat films following reduction recommended. The right AC joint is negative.       XR Knee Right 3 Views   Final Result   IMPRESSION: Postoperative changes of a right total knee arthroplasty. There is a fracture through the midportion of the patella with slight distraction between the superior and inferior poles. Small effusion. Prepatellar edema.      XR Pelvis w Hip Right 1 View   Final Result   IMPRESSION: Both hips negative for fracture or dislocation. Pelvis negative for fracture.      CT Head w/o Contrast   Final Result   IMPRESSION:   1.  Normal head CT.        Laboratory:  Labs Ordered and Resulted from Time of ED Arrival to Time of ED Departure   CBC WITH PLATELETS AND DIFFERENTIAL - Abnormal       Result Value    WBC Count 15.0 (*)     RBC Count 4.05      Hemoglobin 12.5      Hematocrit 36.8      MCV 91      MCH 30.9      MCHC 34.0      RDW 11.9      Platelet Count 233      % Neutrophils 88      % Lymphocytes 8      % Monocytes 3      % Eosinophils 0      % Basophils 0      % Immature Granulocytes 1      NRBCs per 100 WBC 0      Absolute Neutrophils 13.1 (*)     Absolute Lymphocytes 1.2      Absolute Monocytes 0.5      Absolute Eosinophils 0.0      Absolute Basophils 0.1      Absolute Immature Granulocytes 0.1      Absolute NRBCs 0.0     BASIC METABOLIC PANEL    Sodium 135      Potassium 3.6      Chloride 107      Carbon Dioxide (CO2)        Anion Gap        Urea Nitrogen        Creatinine        Calcium        Glucose        GFR Estimate       COVID-19 VIRUS (CORONAVIRUS) BY PCR      Procedures:  [facility]    -Dislocation - Upper Extremity    Date/Time: 2/24/2022  8:53 PM  Performed by: Meghan Haque MD  Authorized by: Meghan Haque MD     Risks, benefits and alternatives discussed.    ED EVALUATION:      Assessment Time: 2/24/2022 9:57 PM      I have performed an Emergency Department Evaluation including taking a history and physical examination, this evaluation will be documented in the electronic medical record for this ED encounter.      ASA Class: Class 3- Severe systemic disease, definite functional limitations    Mallampati: Grade 1- soft palate, uvula, tonsillar pillars, and posterior pharyngeal wall visible    NPO Status: appropriately NPO for procedure    UNIVERSAL PROTOCOL   Site Marked: NA  Prior Images Obtained and Reviewed:  Yes  Required items: Required blood products, implants, devices and special equipment available    Patient identity confirmed:  Verbally with patient, arm band, provided demographic data and hospital-assigned identification number  Patient was reevaluated immediately before administering moderate or deep sedation or anesthesia  Confirmation Checklist:  Patient's identity using two indicators, relevant allergies, procedure was appropriate and matched the consent or emergent situation and correct equipment/implants were available  Time out: Immediately prior to the procedure a time out was called    Universal Protocol: the Joint Commission Universal Protocol was followed    Preparation: Patient was prepped and draped in usual sterile fashion      LOCATION     Location:  Shoulder    Shoulder location:  R shoulder    Shoulder dislocation type: anterior      Chronicity:  New    PRE PROCEDURE ASSESSMENT      Pre-procedure imaging:  X-ray    Imaging findings: dislocation present      Fracture of greater humeral tuberosity: no      Hill-Sachs deformity: no      Distal perfusion: normal      SEDATION  Patient Sedated: Yes    Sedation Type:  Moderate (conscious) sedation  Sedation:  Propofol (90 cc total)  Vital signs: Vital signs monitored  during sedation      ANESTHESIA (see MAR for exact dosages)      Anesthesia method:  None    PROCEDURE DETAILS      Manipulation performed: yes      Shoulder reduction method:  Direct traction    Reduction successful: yes      Reduction confirmed with imaging: yes      Immobilization: shoulder immobilizer.    POST PROCEDURE DETAILS     Neurological function: normal      Distal perfusion: normal      Range of motion: improved        PROCEDURE  Describe Procedure: Reduction was attempted twice without sedation with no success.  Patient was moved to ST03 for sedation for third attempt.  Patient Tolerance:  Patient tolerated the procedure well with no immediate complications  Length of time physician/provider present for 1:1 monitoring during sedation: 15      Emergency Department Course:     Reviewed:  I reviewed nursing notes, vitals, past medical history and Care Everywhere    Assessments:  1909 I obtained history and examined the patient in ED room 28 as noted above.   2033 I rechecked the patient and explained radiology findings. Attempted to reduce the patient's right shoulder.  2108 I updated the patient on X-ray findings.  2122 I reattempted reduction of right shoulder.   2157 I reattempted reduction of right shoulder with sedation. The patient was transferred to ST02 for duration of procedure.    Consults:  2120 I spoke with Dr. Smith in Orthopedic Surgery from [facility] regarding patient's presentation, findings, and plan of care.  2151 I consulted with Dr. Mark of the hospitalist service regarding patient, who agrees to admit patient to hospital in monitored bed.    Interventions:  1930 Morphine 2 mg IV  1930 Zofran 4 mg IV  2045 Zofran 4 mg IV  2114 Morphine 4 mg IV    Disposition:  The patient was admitted to the hospital under the care of Dr. Mark. Anticipate surgery.    Impression & Plan         Medical Decision Making:  Ericka Frias is a 72 year old female who presents for evaluation of a fall.   This was clearly a mechanical fall, not syncope.  There were no prodromal symptoms so I doubt stroke, cardiac arrhythmia or other serious etiology.  Detailed exam shows right knee and right shoulder deformity and a contusion to her head.  Imaging with head CT without acute intracranial trauma.  Right shoulder x-ray with dislocation.  Attempted bedside reduction without sedation but was unable to reduce it at bedside.  Patient was then sedated as stated above and reduction was reattempted and successful.  Patient tolerated sedation well.  Patient also has a distracted patella fracture on the right side.  I discussed this with orthopedics who recommended admission for open reduction and fixation.  Discussed with the hospitalist plans for admission.  Based on this I diddo basic blood work.  Results as above. I would not do workup for syncope, stroke, ACS, PE at this point.  I doubt serious underlying fractures, intracerebral issues, spinal fractures.           Diagnosis:    ICD-10-CM    1. Closed displaced transverse fracture of right patella, initial encounter  S82.031A Case Request: OPEN REDUCTION INTERNAL FIXATION, FRACTURE, PATELLA     Case Request: OPEN REDUCTION INTERNAL FIXATION, FRACTURE, PATELLA   2. Closed displaced fracture of right patella, unspecified fracture morphology, initial encounter  S82.001A    3. Shoulder dislocation, right, initial encounter  S43.004A    4. Closed head injury, initial encounter  S09.90XA    5. Fall, initial encounter  W19.XXXA        Scribe Disclosure:  I, Radha Rodriguez, am serving as a scribe at 7:09 PM on 2/24/2022 to document services personally performed by Meghan Haque MD based on my observations and the provider's statements to me.     This note was completed in part using Dragon voice recognition software. Although reviewed after completion, some word and grammatical errors may occur.              Meghan Haque MD  02/24/22 0951

## 2022-02-25 NOTE — PLAN OF CARE
Goal Outcome Evaluation: patient.    ED admit Patient A&Ox4, VSS on RA. CMS intact. Sling on on LUE. Right Knee swollen & bruises . Bed rest . Iv Infusing . NPO since [place]. Silv[place]. In place. Pain managed with Iv Dilaudid. Ortho Consulted. Will continue monitor.

## 2022-02-25 NOTE — CONSULTS
[facility]    Orthopedic Consultation    Ericka Frias MRN# [id number]   Age: 72 year old YOB: 1950     Date of Admission:  2/24/2022    Reason for consult: Right knee fracture       Requesting physician: Dr. Young       Level of consult: Consult, follow and place orders           Assessment and Plan:   Assessment:   Right periprosthetic patellar fracture, fall from standing  Right native shoulder dislocation with successful closed reduction under sedation      Plan:   OR today 2/25 for ORIF right patella with Dr. Smith  NPO   Bed rest  Prefer pure wick over burton if at all possible  Pain medication as needed, limit narcotics as able  Pre-op optimization per hospitalist  Continue right shoulder immobilizer  Exercises per PT post-operatively           Chief Complaint:   Right knee pain         History of Present Illness:   This patient is a 72 year old female who presents with the following condition requiring a hospital admission:    Per ED note: The patient reportedly stepped on a metal plate while descending some stairs outside of [facility] leading to the parking lot. She landed on her right side, injuring both her knee and her shoulder. Daughter also thinks that patient might have hit the front of her head on one of the cars in the parking lot though is ultmately unsure. No loss of consciousness. She did receive 100 mcg fentanyl from EMS en route. Patient notes history of right knee replacement several years ago and is concerned that something may have happened with this as she has been unable to move her leg since time of fall. The pain is alleviated slightly with lying flat on her back. She denies any pain to her neck, back, or hips.  She is not chronically anticoagulated.  She had TKA done by Dr. Babak Orourke in 2011  She lives at her own home independently with her  who does have some medical issues as well.          Past Medical History:     Past Medical  History:   Diagnosis Date     Arthritis     to (R) knee and annie ft     Gastro-oesophageal reflux disease      PONV (postoperative nausea and vomiting)              Past Surgical History:     Past Surgical History:   Procedure Laterality Date     ARTHROPLASTY KNEE  10/17/2011    Procedure:ARTHROPLASTY KNEE; Right Total Knee Arthroplasty ([employer])^; Surgeon:SERINA MARQUEZ; Location: OR     COLONOSCOPY N/A 7/12/2016    Procedure: COLONOSCOPY;  Surgeon: Lorenzo Pereira MD;  Location:  GI     COSMETIC SURGERY      has had tummy tuck     GYN SURGERY      removed cyst on utertus,     ORTHOPEDIC SURGERY      annie ft fractures and had pinning             Social History:     Social History     Tobacco Use     Smoking status: Never Smoker     Smokeless tobacco: Never Used   Substance Use Topics     Alcohol use: Yes     Comment: 0-5 drinks a week             Family History:   History reviewed. No pertinent family history.          Immunizations:     VACCINE/DOSE   Diptheria   DPT   DTAP   HBIG   Hepatitis A   Hepatitis B   HIB   Influenza   Measles   Meningococcal   MMR   Mumps   Pneumococcal   Polio   Rubella   Small Pox   TDAP   Varicella   Zoster             Allergies:     Allergies   Allergen Reactions     Food      Sesame seeds,bumps/throat swelling     Lisinopril Cough             Medications:     Current Facility-Administered Medications   Medication     acetaminophen (TYLENOL) tablet 650 mg    Or     acetaminophen (TYLENOL) Suppository 650 mg     ALPRAZolam (XANAX) tablet 0.25 mg     amLODIPine (NORVASC) tablet 10 mg     calcium carbonate-vitamin D (OS-KACI with D) per tablet 1 tablet     escitalopram (LEXAPRO) tablet 20 mg     famotidine (PEPCID) tablet 20 mg     folic acid (FOLVITE) tablet 400 mcg     hydrALAZINE (APRESOLINE) injection 10 mg     HYDROmorphone (PF) (DILAUDID) injection 0.3-0.5 mg     labetalol (NORMODYNE/TRANDATE) injection 10 mg     lidocaine (LMX4) cream     lidocaine 1 %  0.1-1 mL     magnesium hydroxide (MILK OF MAGNESIA) suspension 30 mL     melatonin tablet 1 mg     multivitamin w/minerals (THERA-VIT-M) tablet 1 tablet     naloxone (NARCAN) injection 0.2 mg    Or     naloxone (NARCAN) injection 0.4 mg    Or     naloxone (NARCAN) injection 0.2 mg    Or     naloxone (NARCAN) injection 0.4 mg     ondansetron (ZOFRAN-ODT) ODT tab 4 mg    Or     ondansetron (ZOFRAN) injection 4 mg     senna-docusate (SENOKOT-S/PERICOLACE) 8.6-50 MG per tablet 1 tablet    Or     senna-docusate (SENOKOT-S/PERICOLACE) 8.6-50 MG per tablet 2 tablet     sodium chloride (PF) 0.9% PF flush 3 mL     sodium chloride (PF) 0.9% PF flush 3 mL     sodium chloride 0.9% infusion     vitamin D3 (CHOLECALCIFEROL) tablet 50 mcg     vitamin E (TOCOPHEROL) 400 units (180 mg) capsule 400 Units             Review of Systems:   CV: NEGATIVE for chest pain, palpitations or peripheral edema  C: NEGATIVE for fever, chills, change in weight  E/M: NEGATIVE for ear, mouth and throat problems  R: NEGATIVE for significant cough or SOB          Physical Exam:   All vitals have been reviewed  Patient Vitals for the past 24 hrs:   BP Temp Temp src Pulse Resp SpO2 Height Weight   02/25/22 0725 119/72 98.4  F (36.9  C) Oral 79 16 97 % -- --   02/25/22 0416 -- -- -- -- 16 -- -- --   02/25/22 0401 -- -- -- -- 16 -- -- --   02/25/22 0019 104/61 99.1  F (37.3  C) Oral 88 16 96 % -- --   02/24/22 2330 (!) 143/90 -- -- 94 12 93 % -- --   02/24/22 2300 137/55 -- -- 84 20 97 % -- --   02/24/22 2245 (!) 143/73 -- -- 82 24 99 % -- --   02/24/22 2230 (!) 150/60 -- -- 82 24 100 % -- --   02/24/22 2220 (!) 150/75 -- -- 75 28 100 % -- --   02/24/22 2215 -- -- -- -- 15 -- -- --   02/24/22 2215 (!) 152/100 -- -- 75 18 96 % -- --   02/24/22 2212 -- -- -- -- 12 -- -- --   02/24/22 2210 -- -- -- -- 14 -- -- --   02/24/22 2210 (!) 170/90 -- -- 77 20 100 % -- --   02/24/22 2202 -- -- -- 82 18 100 % -- --   02/24/22 2100 -- -- -- -- -- 99 % -- --   02/24/22  "2030 -- -- -- -- -- 98 % -- --   02/24/22 1900 (!) 150/80 -- -- 74 18 99 % 1.578 m (5' 2.13\") 72.6 kg (160 lb)   02/24/22 1856 (!) 150/80 98.7  F (37.1  C) Oral 75 16 97 % 1.585 m (5' 2.4\") 72.6 kg (160 lb)       Intake/Output Summary (Last 24 hours) at 2/25/2022 1451  Last data filed at 2/25/2022 1050  Gross per 24 hour   Intake 983 ml   Output --   Net 983 ml     Patient resting comfortably in bed  Right LE with anterior ecchymosis and small abrasion  2+ effusion right knee  Unable to SLR  Left knee with small superficial laceration   No effusion left knee  No pain bilateral hip with log roll  Bilateral calves are soft, non-tender.  Bilateral lower extremity is NVI.  Sensation intact bilateral lower extremities  5/5 motor with resisted dorsi and plantar flexion bilaterally  +Dp pulse  Right shoulder without ecchymosis  No effusion right shoulder  Shoulder immobilizer worn  Sensation intact in upper arm over biceps as well as in hand r/m/u nerve distribution  Able to abduct and oppose right thumb  +Radial pulse  +cap refill             Data:   All laboratory data reviewed  Results for orders placed or performed during the hospital encounter of 02/24/22   -Dislocation - Upper Extremity     Status: None    Narrative    Meghan Haque MD     2/24/2022 10:48 PM  [facility]    -Dislocation - Upper Extremity    Date/Time: 2/24/2022 8:53 PM  Performed by: Meghan Haque MD  Authorized by: Meghan Haque MD     Risks, benefits and alternatives discussed.    ED EVALUATION:      Assessment Time: 2/24/2022 9:57 PM      I have performed an Emergency Department Evaluation including taking a   history and physical examination, this evaluation will be documented in   the electronic medical record for this ED encounter.      ASA Class: Class 3- Severe systemic disease, definite functional   limitations    Mallampati: Grade 1- soft palate, uvula, tonsillar pillars, and   posterior pharyngeal " wall visible    NPO Status: appropriately NPO for procedure    UNIVERSAL PROTOCOL   Site Marked: NA  Prior Images Obtained and Reviewed:  Yes  Required items: Required blood products, implants, devices and special   equipment available    Patient identity confirmed:  Verbally with patient, arm band, provided   demographic data and hospital-assigned identification number  Patient was reevaluated immediately before administering moderate or deep   sedation or anesthesia  Confirmation Checklist:  Patient's identity using two indicators, relevant   allergies, procedure was appropriate and matched the consent or emergent   situation and correct equipment/implants were available  Time out: Immediately prior to the procedure a time out was called    Universal Protocol: the Joint Commission Universal Protocol was followed    Preparation: Patient was prepped and draped in usual sterile fashion      LOCATION     Location:  Shoulder    Shoulder location:  R shoulder    Shoulder dislocation type: anterior      Chronicity:  New    PRE PROCEDURE ASSESSMENT      Pre-procedure imaging:  X-ray    Imaging findings: dislocation present      Fracture of greater humeral tuberosity: no      Hill-Sachs deformity: no      Distal perfusion: normal      SEDATION  Patient Sedated: Yes    Sedation Type:  Moderate (conscious) sedation  Sedation:  Propofol (90 cc total)  Vital signs: Vital signs monitored during sedation      ANESTHESIA (see MAR for exact dosages)      Anesthesia method:  None    PROCEDURE DETAILS      Manipulation performed: yes      Shoulder reduction method:  Direct traction    Reduction successful: yes      Reduction confirmed with imaging: yes      Immobilization: shoulder immobilizer.    POST PROCEDURE DETAILS     Neurological function: normal      Distal perfusion: normal      Range of motion: improved        PROCEDURE  Describe Procedure: Reduction was attempted twice without sedation with no   success.  Patient was  moved to [facility] for sedation for third attempt.  Patient Tolerance:  Patient tolerated the procedure well with no immediate   complications  Length of time physician/provider present for 1:1 monitoring during   sedation: 15   CT Head w/o Contrast     Status: None    Narrative    EXAM: CT HEAD W/O CONTRAST  LOCATION: [facility]  DATE/TIME: 2/24/2022 7:56 PM    INDICATION: Head trauma, minor (Age >= 65y), pain.  COMPARISON: None.  TECHNIQUE: Routine CT Head without IV contrast. Multiplanar reformats. Dose reduction techniques were used.    FINDINGS:  INTRACRANIAL CONTENTS: No intracranial hemorrhage, extraaxial collection, or mass effect.  No CT evidence of acute infarct. Normal parenchymal attenuation. Normal ventricles and sulci.     VISUALIZED ORBITS/SINUSES/MASTOIDS: No intraorbital abnormality. No paranasal sinus mucosal disease. No middle ear or mastoid effusion.    BONES/SOFT TISSUES: No acute abnormality.      Impression    IMPRESSION:  1.  Normal head CT.   XR Shoulder Right G/E 3 Views     Status: None    Narrative    EXAM: XR SHOULDER RIGHT G/E 3 VIEWS  LOCATION: [facility]  DATE/TIME: 2/24/2022 8:13 PM    INDICATION: Fall, pain  COMPARISON: None.      Impression    IMPRESSION: Anterior dislocation of the right glenohumeral joint. No fractures are identified but repeat films following reduction recommended. The right AC joint is negative.    XR Knee Right 3 Views     Status: None    Narrative    EXAM: XR KNEE RIGHT 3 VIEWS  LOCATION: [facility]  DATE/TIME: 2/24/2022 8:13 PM    INDICATION: Fall, pain  COMPARISON: None.      Impression    IMPRESSION: Postoperative changes of a right total knee arthroplasty. There is a fracture through the midportion of the patella with slight distraction between the superior and inferior poles. Small effusion. Prepatellar edema.   XR Pelvis w Hip Right 1 View     Status: None    Narrative    EXAM: XR  PELVIS AND HIP RIGHT 1 VIEW  LOCATION: [facility]  DATE/TIME: 2/24/2022 8:13 PM    INDICATION: fall, pain  COMPARISON: None.      Impression    IMPRESSION: Both hips negative for fracture or dislocation. Pelvis negative for fracture.   XR Shoulder Right Port 1 View     Status: None    Narrative    EXAM: XR SHOULDER RIGHT PORT 1 VIEW  LOCATION: [facility]  DATE/TIME: 2/24/2022 8:55 PM    INDICATION: post reduction  COMPARISON: Earlier today      Impression    IMPRESSION: Unchanged anterior dislocation of the humeral head relative to the glenoid. No definite fracture is identified.   XR Shoulder Right Port G/E 2 Views     Status: None    Narrative    EXAM: XR SHOULDER RIGHT PORT G/E 2 VIEWS  LOCATION: [facility]  DATE/TIME: 2/24/2022 10:20 PM    INDICATION: Post reduction.  COMPARISON: 02/24/2022      Impression    IMPRESSION: Previously seen anteroinferior right shoulder dislocation has been reduced to anatomic alignment.    Asymptomatic COVID-19 Virus (Coronavirus) by PCR Nasopharyngeal     Status: Normal    Specimen: Nasopharyngeal; Swab   Result Value Ref Range    SARS CoV2 PCR Negative Negative    Narrative    Testing was performed using the robbie  SARS-CoV-2 & Influenza A/B Assay on the robbie  Elif  System.  This test should be ordered for the detection of SARS-COV-2 in individuals who meet SARS-CoV-2 clinical and/or epidemiological criteria. Test performance is unknown in asymptomatic patients.  This test is for in vitro diagnostic use under the FDA EUA for laboratories certified under CLIA to perform moderate and/or high complexity testing. This test has not been FDA cleared or approved.  A negative test does not rule out the presence of PCR inhibitors in the specimen or target RNA in concentration below the limit of detection for the assay. The possibility of a false negative should be considered if the patient's recent exposure or  clinical presentation suggests COVID-19.  [facility] are certified under the Clinical Laboratory Improvement Amendments of 1988 (CLIA-88) as qualified to perform moderate and/or high complexity laboratory testing.   Basic metabolic panel     Status: Abnormal   Result Value Ref Range    Sodium 135 133 - 144 mmol/L    Potassium 3.6 3.4 - 5.3 mmol/L    Chloride 107 94 - 109 mmol/L    Carbon Dioxide (CO2) 23 20 - 32 mmol/L    Anion Gap 5 3 - 14 mmol/L    Urea Nitrogen 18 7 - 30 mg/dL    Creatinine 0.63 0.52 - 1.04 mg/dL    Calcium 8.3 (L) 8.5 - 10.1 mg/dL    Glucose 183 (H) 70 - 99 mg/dL    GFR Estimate >90 >60 mL/min/1.73m2   CBC with platelets and differential     Status: Abnormal   Result Value Ref Range    WBC Count 15.0 (H) 4.0 - 11.0 10e3/uL    RBC Count 4.05 3.80 - 5.20 10e6/uL    Hemoglobin 12.5 11.7 - 15.7 g/dL    Hematocrit 36.8 35.0 - 47.0 %    MCV 91 78 - 100 fL    MCH 30.9 26.5 - 33.0 pg    MCHC 34.0 31.5 - 36.5 g/dL    RDW 11.9 10.0 - 15.0 %    Platelet Count 233 150 - 450 10e3/uL    % Neutrophils 88 %    % Lymphocytes 8 %    % Monocytes 3 %    % Eosinophils 0 %    % Basophils 0 %    % Immature Granulocytes 1 %    NRBCs per 100 WBC 0 <1 /100    Absolute Neutrophils 13.1 (H) 1.6 - 8.3 10e3/uL    Absolute Lymphocytes 1.2 0.8 - 5.3 10e3/uL    Absolute Monocytes 0.5 0.0 - 1.3 10e3/uL    Absolute Eosinophils 0.0 0.0 - 0.7 10e3/uL    Absolute Basophils 0.1 0.0 - 0.2 10e3/uL    Absolute Immature Granulocytes 0.1 <=0.4 10e3/uL    Absolute NRBCs 0.0 10e3/uL   UA reflex to Microscopic and Culture     Status: Abnormal    Specimen: Urine, Clean Catch   Result Value Ref Range    Color Urine Light Yellow Colorless, Straw, Light Yellow, Yellow    Appearance Urine Clear Clear    Glucose Urine Negative Negative mg/dL    Bilirubin Urine Negative Negative    Ketones Urine Trace (A) Negative mg/dL    Specific Gravity Urine 1.019 1.003 - 1.035    Blood Urine Negative Negative    pH Urine 6.5 5.0 - 7.0     Protein Albumin Urine Negative Negative mg/dL    Urobilinogen Urine Normal Normal, 2.0 mg/dL    Nitrite Urine Negative Negative    Leukocyte Esterase Urine Negative Negative    Narrative    Microscopic not indicated   EKG 12-lead, tracing only     Status: None (Preliminary result)   Result Value Ref Range    Systolic Blood Pressure  mmHg    Diastolic Blood Pressure  mmHg    Ventricular Rate 69 BPM    Atrial Rate 69 BPM    OR Interval 178 ms    QRS Duration 80 ms     ms    QTc 409 ms    P Axis 49 degrees    R AXIS 59 degrees    T Axis 71 degrees    Interpretation ECG       Sinus rhythm  Nonspecific T wave abnormality  Abnormal ECG  When compared with ECG of 12-NOV-2010 08:08,  Nonspecific T wave abnormality no longer evident in Anterior leads     CBC with platelets differential     Status: Abnormal    Narrative    The following orders were created for panel order CBC with platelets differential.  Procedure                               Abnormality         Status                     ---------                               -----------         ------                     CBC with platelets and d...[[id number]]  Abnormal            Final result                 Please view results for these tests on the individual orders.          Attestation:  I have reviewed today's vital signs, notes, medications, labs and imaging with Dr. Smith.  Amount of time performed on this consult: 30 minutes.    Krissy Benjamin PA-C

## 2022-02-25 NOTE — ED TRIAGE NOTES
Arrives via EMS from out to dinner with family. Slipped on ice in parking lot. Fell on right shoulder and knee. Did hit head but no LOC. Got a total of 100  Mcg of fentanyl from EMS prior to arrival. Denies neck or back pain. Most pain in right shoulder and across the knee cap.

## 2022-02-25 NOTE — ANESTHESIA PROCEDURE NOTES
Femoral Procedure Note    Pre-Procedure   Staff -        Anesthesiologist:  Raul Hedrick MD       Performed By: Anesthesiologist       Location: pre-op       Pre-Anesthestic Checklist: patient identified, IV checked, risks and benefits discussed, informed consent, pre-op evaluation, at physician/surgeon's request and post-op pain management  Timeout:       Correct Patient: Yes        Correct Procedure: Yes        Correct Site: Yes        Correct Position: Yes        Correct Laterality: Yes        Site Marked: Yes  Procedure Documentation  Procedure: Femoral       Laterality: right       Patient Position: supine       Skin prep: Chloraprep      Local skin infiltrated with mL of 1% lidocaine.        Needle Type: short bevel       Needle Gauge: 22.        Needle Length (millimeters): 50        Ultrasound guided       1. Ultrasound was used to identify targeted nerve, plexus, vascular marker, or fascial plane and place a needle adjacent to it in real-time.       2. Ultrasound was used to visualize the spread of anesthetic in close proximity to the above referenced structure.       3. A permanent image is entered into the patient's record.    Assessment/Narrative         The placement was negative for: blood aspirated, painful injection and site bleeding       Paresthesias: No.     Bolus given via needle..        Secured via.        Insertion/Infusion Method: Single Shot       Complications: none       Injection made incrementally with aspirations every 5 mL.    Medication(s) Administered   Ropivacaine 0.5% w/ 1:400K Epi (Injection), 20 mL   Comments:  Ultrasound Interpretation, peripheral nerve block    1.  Under ultrasound guidance, needle was inserted and placed in close proximity to the nerve.  2. Ultrasound was also used to visualize the spread of the anesthetic in close proximity to the nerve being blocked.  3. The nerve appeared anatomically normal.  4. There were no apparent abnormal pathological  findings.  5. A permanent ultrasound image was saved n the patient's record.    Raul Hedrick MD   5:44 PM

## 2022-02-25 NOTE — ED NOTES
Bed: ED28  Expected date: 2/24/22  Expected time: 6:34 PM  Means of arrival: Ambulance  Comments:  426 72f fall, knee, shoulder ETA [id number]

## 2022-02-25 NOTE — PROGRESS NOTES
[facility]    Medicine Progress Note - Hospitalist Service    Date of Admission:  2/24/2022    Assessment & Plan        Ericka Frias is a 72 year old female who presented to the ER following a fall which results in shoulder and knee pain. She was found to have a right shoulder dislocation and right patella fracture. The hospitalist service was contacted to admit her for further evaluation and management.    Right patella fracture  Right shoulder dislocation  History of right TKA ~10 years ago  She was walking outside a restaurant when she had a mechanical fall while descending stair. She landed on R side injuring knee and shoulder. ? Head trauma. No LOC. Vitals stable on admission, mildly hypertensive. Imaging with dislocation of R shoulder. Patellar fracture. No hip fracture. CT head normal.   - Admitted to inpatient.  - Orthopedic surgery consulted (order had not been placed overnight, entered now).  - EKG OK. Labs OK except mildly elevated WBC as noted below.  - As needed pain medications available.  - NPO, IV fluids while awaiting orthopedic evaluation and possible surgical intervention.  - She is normally pretty active at baseline and denies any chest pain or shortness of breath with activity. No further work-up/intervention needed from a hospitalist standpoint prior to proceeding with surgery.    Leukocytosis  Suspect reactive. Afebrile. UA Negative.  - Recheck in AM.    Hypertension   - Continue PTA amlodipine 10 mg daily.  - Hold PTA losartan 25 mg daily for now, reassess post-operatively.  - PRN labetalol and hydralazine available for significantly elevated blood pressures.    Anxiety  - Continue PTA escitalopram 20 mg daily and xanax 0.25 nightly PRN.    GERD  - Continue PTA PRN famotidine.    COVID-19 PCR negative  - Routine admission COVID-19 PCR testing was negative on 2/24/22.       Diet: NPO for Medical/Clinical Reasons Except for: Meds, Ice Chips    DVT Prophylaxis:  "Pneumatic Compression Devices  Raphael Catheter: Not present  Central Lines: None  Cardiac Monitoring: None  Code Status: Full Code      Disposition Plan   Expected Discharge: 02/26/2022     Anticipated discharge location:  Awaiting care coordination huddle  Delays:            The patient's care was discussed with the Bedside Nurse, Patient and Patient's Family.    Leonardo Young MD  Hospitalist Service  [facility]  Securely message with the Vocera Web Console (learn more here)  Text page via [employer] Paging/Directory         Clinically Significant Risk Factors Present on Admission                 # Overweight: Estimated body mass index is 29.15 kg/m  as calculated from the following:    Height as of this encounter: 1.578 m (5' 2.13\").    Weight as of this encounter: 72.6 kg (160 lb).      ______________________________________________________________________    Interval History   Ericka Frias was seen this morning. She feels OK. Some pain in her right shoulder. Denies fevers, chest pain, shortness of breath, nausea, abdominal pain. No numbness, tingling, or focal weakness. Her daughter was in the room with her, discussed plan of care.    Data reviewed today: I reviewed all medications, new labs and imaging results over the last 24 hours. I personally reviewed no images or EKG's today.    Physical Exam   Vital Signs: Temp: 98.4  F (36.9  C) Temp src: Oral BP: 119/72 Pulse: 79   Resp: 16 SpO2: 97 % O2 Device: None (Room air) Oxygen Delivery: 2 LPM  Weight: 160 lbs 0 oz  Constitutional: awake, alert, cooperative, no apparent distress, laying in the hospital bed  Respiratory: clear to auscultation bilaterally, no crackles or wheezing  Cardiovascular: regular rate and rhythm, normal S1 and S2, no murmur noted  GI: normal bowel sounds, soft, non-distended, non-tender  Skin: warm, dry  Musculoskeletal: no lower extremity pitting edema present, right knee tender, right shoulder " tender  Neurologic: awake, alert, answers questions appropriately, moves all extremities    Data   Recent Labs   Lab 02/24/22  2220   WBC 15.0*   HGB 12.5   MCV 91         POTASSIUM 3.6   CHLORIDE 107   CO2 23   BUN 18   CR 0.63   ANIONGAP 5   KACI 8.3*   *     Recent Results (from the past 24 hour(s))   CT Head w/o Contrast    Narrative    EXAM: CT HEAD W/O CONTRAST  LOCATION: [facility]  DATE/TIME: 2/24/2022 7:56 PM    INDICATION: Head trauma, minor (Age >= 65y), pain.  COMPARISON: None.  TECHNIQUE: Routine CT Head without IV contrast. Multiplanar reformats. Dose reduction techniques were used.    FINDINGS:  INTRACRANIAL CONTENTS: No intracranial hemorrhage, extraaxial collection, or mass effect.  No CT evidence of acute infarct. Normal parenchymal attenuation. Normal ventricles and sulci.     VISUALIZED ORBITS/SINUSES/MASTOIDS: No intraorbital abnormality. No paranasal sinus mucosal disease. No middle ear or mastoid effusion.    BONES/SOFT TISSUES: No acute abnormality.      Impression    IMPRESSION:  1.  Normal head CT.   XR Shoulder Right G/E 3 Views    Narrative    EXAM: XR SHOULDER RIGHT G/E 3 VIEWS  LOCATION: [facility]  DATE/TIME: 2/24/2022 8:13 PM    INDICATION: Fall, pain  COMPARISON: None.      Impression    IMPRESSION: Anterior dislocation of the right glenohumeral joint. No fractures are identified but repeat films following reduction recommended. The right AC joint is negative.    XR Knee Right 3 Views    Narrative    EXAM: XR KNEE RIGHT 3 VIEWS  LOCATION: [facility]  DATE/TIME: 2/24/2022 8:13 PM    INDICATION: Fall, pain  COMPARISON: None.      Impression    IMPRESSION: Postoperative changes of a right total knee arthroplasty. There is a fracture through the midportion of the patella with slight distraction between the superior and inferior poles. Small effusion. Prepatellar edema.   XR Pelvis w Hip Right  1 View    Narrative    EXAM: XR PELVIS AND HIP RIGHT 1 VIEW  LOCATION: [facility]  DATE/TIME: 2/24/2022 8:13 PM    INDICATION: fall, pain  COMPARISON: None.      Impression    IMPRESSION: Both hips negative for fracture or dislocation. Pelvis negative for fracture.   XR Shoulder Right Port 1 View    Narrative    EXAM: XR SHOULDER RIGHT PORT 1 VIEW  LOCATION: [facility]  DATE/TIME: 2/24/2022 8:55 PM    INDICATION: post reduction  COMPARISON: Earlier today      Impression    IMPRESSION: Unchanged anterior dislocation of the humeral head relative to the glenoid. No definite fracture is identified.   XR Shoulder Right Port G/E 2 Views    Narrative    EXAM: XR SHOULDER RIGHT PORT G/E 2 VIEWS  LOCATION: [facility]  DATE/TIME: 2/24/2022 10:20 PM    INDICATION: Post reduction.  COMPARISON: 02/24/2022      Impression    IMPRESSION: Previously seen anteroinferior right shoulder dislocation has been reduced to anatomic alignment.      Medications     sodium chloride 100 mL/hr at 02/25/22 1050       amLODIPine  10 mg Oral Daily     calcium carbonate-vitamin D  1 tablet Oral Daily     escitalopram  20 mg Oral Daily     folic acid  400 mcg Oral Daily     multivitamin w/minerals  1 tablet Oral Daily     sodium chloride (PF)  3 mL Intracatheter Q8H     vitamin D3  50 mcg Oral Daily     vitamin E  400 Units Oral Daily

## 2022-02-25 NOTE — PROGRESS NOTES
.RECEIVING UNIT ED HANDOFF REVIEW    ED Nurse Handoff Report was reviewed by: Rabia Brothers RN on February 24, 2022 at 11:13 PM

## 2022-02-25 NOTE — ED PROVIDER NOTES
History   Chief Complaint:  Fall, Shoulder Pain, and Knee Pain       The history is provided by the patient and a relative.      Ericka Frias is a 72 year old female with history of hypertension and GERD who presents via EMS for evaluation of right shoulder and knee pain in the setting of mechanical fall that occurred just prior to arrival. The patient reportedly stepped on a metal plate while descending some stairs outside of [place] leading to the parking lot. She landed on her right side, injuring both her knee and her shoulder. Daughter also thinks that patient might have hit the front of her head on one of the cars in the parking lot though is ultmately unsure. No loss of consciousness. She did receive 100 mcg fentanyl from EMS en route. Patient notes history of right knee replacement several years ago and is concerned that something may have happened with this as she has been unable to move her leg since time of fall. The pain is alleviated slightly with lying flat on her back. She denies any pain to her neck, back, or hips.    Review of Systems   Musculoskeletal: Positive for arthralgias (R shoulder, R knee, denies hips) and gait problem (d/t injury). Negative for back pain and neck pain.   Neurological: Negative for syncope.   All other systems reviewed and are negative.    Allergies:  Lisinopril    Medications:  Aspirin 81 mg  Escitalopram  Famotidine  Losartan  Amlodipine  Senna-docusate    Past Medical History:     Arthritis  GERD  Hypertension  Anxiety    Past Surgical History:    Right TKA  Colonoscopy  Abdominoplasty  Sphincterectomy with stone extraction  Cholecystectomy  Cyst removal from R ovary  Pinning of bilateral foot fractures    Family History:    Father - hyperlipidemia, hypertension, neuropathy, bladder cancer  Mother - hyperlipidemia, hypertension, heart failure, psychiatric illness  Sister - diabetes, heart failure    Social History:  The patient was accompanied to the ED by EMS  "and her daughter.  Marital status:     Physical Exam     Patient Vitals for the past 24 hrs:   BP Temp Temp src Pulse Resp SpO2 Height Weight   02/24/22 2230 (!) 150/60 -- -- 82 24 100 % -- --   02/24/22 2220 (!) 150/75 -- -- 75 28 100 % -- --   02/24/22 2215 -- -- -- -- 15 -- -- --   02/24/22 2215 (!) 152/100 -- -- 75 18 96 % -- --   02/24/22 2212 -- -- -- -- 12 -- -- --   02/24/22 2210 -- -- -- -- 14 -- -- --   02/24/22 2210 (!) 170/90 -- -- 77 20 100 % -- --   02/24/22 2202 -- -- -- 82 18 100 % -- --   02/24/22 2100 -- -- -- -- -- 99 % -- --   02/24/22 2030 -- -- -- -- -- 98 % -- --   02/24/22 1900 (!) 150/80 -- -- 74 18 99 % 1.578 m (5' 2.13\") 72.6 kg (160 lb)   02/24/22 1856 (!) 150/80 98.7  F (37.1  C) Oral 75 16 97 % 1.585 m (5' 2.4\") 72.6 kg (160 lb)       Physical Exam  General: Patient is alert and normal appearing.  HEENT: Head contusion to the forehead   Eyes: pupils equal and reactive. Conjunctiva clear   Nares: patent   Oropharynx: no lesions, uvula midline, no palatal draping, normal voice, no trismus  Neck: Supple without lymphadenopathy, no meningismus.  Cleared by [employer]  Chest: Heart regular rate and rhythm.   Lungs: Equal clear to auscultation with no wheeze or rales  Abdomen: Soft, non tender, nondistended, normal bowel sounds  Back: No costovertebral angle tenderness, no midline C, T or L spine tenderness  Neuro: Grossly nonfocal, normal speech, strength equal bilaterally, CN 2-12 intact  Extremities: Right shoulder with obvious deformity and tenderness to palpation, right knee with edema and tenderness, no pelvis tenderness to palpation, feet warm and well-perfused equal, equal radial and DP pulses. No clubbing, cyanosis.  No edema  Skin: Warm and dry with no rash.       Emergency Department Course     Imaging:  XR Shoulder Right Port G/E 2 Views   Preliminary Result   IMPRESSION: Previously seen anteroinferior right shoulder dislocation has been reduced to anatomic alignment.       XR " Shoulder Right Port 1 View   Final Result   IMPRESSION: Unchanged anterior dislocation of the humeral head relative to the glenoid. No definite fracture is identified.      XR Shoulder Right G/E 3 Views   Final Result   IMPRESSION: Anterior dislocation of the right glenohumeral joint. No fractures are identified but repeat films following reduction recommended. The right AC joint is negative.       XR Knee Right 3 Views   Final Result   IMPRESSION: Postoperative changes of a right total knee arthroplasty. There is a fracture through the midportion of the patella with slight distraction between the superior and inferior poles. Small effusion. Prepatellar edema.      XR Pelvis w Hip Right 1 View   Final Result   IMPRESSION: Both hips negative for fracture or dislocation. Pelvis negative for fracture.      CT Head w/o Contrast   Final Result   IMPRESSION:   1.  Normal head CT.        Laboratory:  Labs Ordered and Resulted from Time of ED Arrival to Time of ED Departure   CBC WITH PLATELETS AND DIFFERENTIAL - Abnormal       Result Value    WBC Count 15.0 (*)     RBC Count 4.05      Hemoglobin 12.5      Hematocrit 36.8      MCV 91      MCH 30.9      MCHC 34.0      RDW 11.9      Platelet Count 233      % Neutrophils 88      % Lymphocytes 8      % Monocytes 3      % Eosinophils 0      % Basophils 0      % Immature Granulocytes 1      NRBCs per 100 WBC 0      Absolute Neutrophils 13.1 (*)     Absolute Lymphocytes 1.2      Absolute Monocytes 0.5      Absolute Eosinophils 0.0      Absolute Basophils 0.1      Absolute Immature Granulocytes 0.1      Absolute NRBCs 0.0     BASIC METABOLIC PANEL    Sodium 135      Potassium 3.6      Chloride 107      Carbon Dioxide (CO2)        Anion Gap        Urea Nitrogen        Creatinine        Calcium        Glucose        GFR Estimate       COVID-19 VIRUS (CORONAVIRUS) BY PCR      Procedures:  [facility]    -Dislocation - Upper Extremity    Date/Time: 2/24/2022  8:53 PM  Performed by: Meghan Haque MD  Authorized by: Meghan Haque MD     Risks, benefits and alternatives discussed.    ED EVALUATION:      Assessment Time: 2/24/2022 9:57 PM      I have performed an Emergency Department Evaluation including taking a history and physical examination, this evaluation will be documented in the electronic medical record for this ED encounter.      ASA Class: Class 3- Severe systemic disease, definite functional limitations    Mallampati: Grade 1- soft palate, uvula, tonsillar pillars, and posterior pharyngeal wall visible    NPO Status: appropriately NPO for procedure    UNIVERSAL PROTOCOL   Site Marked: NA  Prior Images Obtained and Reviewed:  Yes  Required items: Required blood products, implants, devices and special equipment available    Patient identity confirmed:  Verbally with patient, arm band, provided demographic data and hospital-assigned identification number  Patient was reevaluated immediately before administering moderate or deep sedation or anesthesia  Confirmation Checklist:  Patient's identity using two indicators, relevant allergies, procedure was appropriate and matched the consent or emergent situation and correct equipment/implants were available  Time out: Immediately prior to the procedure a time out was called    Universal Protocol: the Joint Commission Universal Protocol was followed    Preparation: Patient was prepped and draped in usual sterile fashion      LOCATION     Location:  Shoulder    Shoulder location:  R shoulder    Shoulder dislocation type: anterior      Chronicity:  New    PRE PROCEDURE ASSESSMENT      Pre-procedure imaging:  X-ray    Imaging findings: dislocation present      Fracture of greater humeral tuberosity: no      Hill-Sachs deformity: no      Distal perfusion: normal      SEDATION  Patient Sedated: Yes    Sedation Type:  Moderate (conscious) sedation  Sedation:  Propofol (90 cc total)  Vital signs: Vital signs monitored  during sedation      ANESTHESIA (see MAR for exact dosages)      Anesthesia method:  None    PROCEDURE DETAILS      Manipulation performed: yes      Shoulder reduction method:  Direct traction    Reduction successful: yes      Reduction confirmed with imaging: yes      Immobilization: shoulder immobilizer.    POST PROCEDURE DETAILS     Neurological function: normal      Distal perfusion: normal      Range of motion: improved        PROCEDURE  Describe Procedure: Reduction was attempted twice without sedation with no success.  Patient was moved to ST03 for sedation for third attempt.  Patient Tolerance:  Patient tolerated the procedure well with no immediate complications  Length of time physician/provider present for 1:1 monitoring during sedation: 15      Emergency Department Course:     Reviewed:  I reviewed nursing notes, vitals, past medical history and Care Everywhere    Assessments:  1909 I obtained history and examined the patient in ED room 28 as noted above.   2033 I rechecked the patient and explained radiology findings. Attempted to reduce the patient's right shoulder.  2108 I updated the patient on X-ray findings.  2122 I reattempted reduction of right shoulder.   2157 I reattempted reduction of right shoulder with sedation. The patient was transferred to ST02 for duration of procedure.    Consults:  2120 I spoke with Dr. Smith in Orthopedic Surgery from [facility] regarding patient's presentation, findings, and plan of care.  2151 I consulted with Dr. Mark of the hospitalist service regarding patient, who agrees to admit patient to hospital in monitored bed.    Interventions:  1930 Morphine 2 mg IV  1930 Zofran 4 mg IV  2045 Zofran 4 mg IV  2114 Morphine 4 mg IV    Disposition:  The patient was admitted to the hospital under the care of Dr. Mark. Anticipate surgery.    Impression & Plan         Medical Decision Making:  Ericka Frias is a 72 year old female who presents for evaluation of a fall.   This was clearly a mechanical fall, not syncope.  There were no prodromal symptoms so I doubt stroke, cardiac arrhythmia or other serious etiology.  Detailed exam shows right knee and right shoulder deformity and a contusion to her head.  Imaging with head CT without acute intracranial trauma.  Right shoulder x-ray with dislocation.  Attempted bedside reduction without sedation but was unable to reduce it at bedside.  Patient was then sedated as stated above and reduction was reattempted and successful.  Patient tolerated sedation well.  Patient also has a distracted patella fracture on the right side.  I discussed this with orthopedics who recommended admission for open reduction and fixation.  Discussed with the hospitalist plans for admission.  Based on this I diddo basic blood work.  Results as above. I would not do workup for syncope, stroke, ACS, PE at this point.  I doubt serious underlying fractures, intracerebral issues, spinal fractures.           Diagnosis:    ICD-10-CM    1. Closed displaced transverse fracture of right patella, initial encounter  S82.031A Case Request: OPEN REDUCTION INTERNAL FIXATION, FRACTURE, PATELLA     Case Request: OPEN REDUCTION INTERNAL FIXATION, FRACTURE, PATELLA   2. Closed displaced fracture of right patella, unspecified fracture morphology, initial encounter  S82.001A    3. Shoulder dislocation, right, initial encounter  S43.004A    4. Closed head injury, initial encounter  S09.90XA    5. Fall, initial encounter  W19.XXXA        Scribe Disclosure:  I, Radha Rodriguez, am serving as a scribe at 7:09 PM on 2/24/2022 to document services personally performed by Meghan Haque MD based on my observations and the provider's statements to me.     This note was completed in part using Dragon voice recognition software. Although reviewed after completion, some word and grammatical errors may occur.              Meghan Haque MD  02/24/22 [id number]

## 2022-02-25 NOTE — H&P
[facility]    History and Physical  Hospitalist       Date of Admission:  2/24/2022  Date of Service (when I saw the patient): 02/24/22    Assessment & Plan   Ericka Frias is a 72 year old female who presents with fall with shoulder and knee pain    R patella fracture  R shoulder dislocation  Hx TKA on R ~10 years ago  Outside restaurant when had mechanical fall while descending stair. She landed on R side injuring knee and shoulder. ? Head trauma. No LOC. Vitals stable on admission, mildly hypertensive. Imaging with dislocation of R shoulder. Patellar fracture. No hip fracture. CT head normal.   - NPO, IV fluids  - check preop EKG  - orthopedic surgery consult  - prn acetaminophen, hydromorphone for pain    Leukocytosis  Suspect reactive. Afebrile  - check UA  - monitor    Hypertension   [needs rec- amlodipine 10 mg daily, losartan 25 mg daily]  - hold ARB perioperatively   - continue amlodipine 10 mg daily  - prn labetalol, hydralazine SBP>180    Anxiety  [needs rec- escitalopram 20 mg daily, prn alprazolam 0.25 mg at HS]  - resume    GERD  - prn famotidine 20 mg     COVID-19 pending    DVT Prophylaxis: Pneumatic Compression Devices  Code Status: full code    Disposition: Expected discharge in 3-5 days     Ta Mark MD  [phone] (P)  Text Page     Primary Care Physician   Theresa Mcginnis MD    Chief Complaint   Fall with R shoulder and knee pain    History is obtained from the patient and medical records    History of Present Illness   Ericka Frias is a 72 year old female who presents with pain after fall.  Patient was at dinner with family and was leaving the restaurant.  She went to step down a step when she hit a metal area on the ground.  There was ice and snow covering it so she did not see the ice.  She went down and fell on her right side.  She immediately had pain in her right shoulder and right knee.  She denied any palpitations, chest pain, dizziness or loss of  consciousness.  She otherwise denies shortness of breath or abdominal pain.  She is generally very healthy and carries a history of hypertension and anxiety, and states her anxiety is on her excellent control.  She denies any fevers or chills.  She underwent reduction of her dislocated right shoulder with improvement in her pain.  Her right leg pain is okay when she is at rest as long she does not move it.    Past Medical History    I have reviewed this patient's medical history and updated it with pertinent information if needed.   Past Medical History:   Diagnosis Date     Arthritis     to (R) knee and annie ft     Gastro-oesophageal reflux disease      PONV (postoperative nausea and vomiting)        Past Surgical History   I have reviewed this patient's surgical history and updated it with pertinent information if needed.  Past Surgical History:   Procedure Laterality Date     ARTHROPLASTY KNEE  10/17/2011    Procedure:ARTHROPLASTY KNEE; Right Total Knee Arthroplasty ([employer])^; Surgeon:SERINA MARQUEZ; Location: OR     COLONOSCOPY N/A 7/12/2016    Procedure: COLONOSCOPY;  Surgeon: Lorenzo Pereira MD;  Location:  GI     COSMETIC SURGERY      has had tummy tuck     GYN SURGERY      removed cyst on utertus,     ORTHOPEDIC SURGERY      annie ft fractures and had pinning       Prior to Admission Medications   Prior to Admission Medications   Prescriptions Last Dose Informant Patient Reported? Taking?   ASPIRIN ADULT LOW STRENGTH PO   Yes No   Cholecalciferol (VITAMIN D3 PO)   Yes No   Sig: Take by mouth daily   FOLIC ACID PO   Yes No   Sig: Take 1 mg by mouth daily   Multiple Vitamins-Minerals (MULTIVITAMIN PO)   Yes No   Naproxen Sodium (ALEVE PO)   Yes No   Prenatal Vit-Fe Fumarate-FA 14-0.4 MG TABS   Yes No   Sig: Take  by mouth daily.   cyanocobalamin (VITAMIN  B-12) 1000 MCG tablet   Yes No   Sig: Take by mouth daily   enoxaparin (LOVENOX) 30 MG/0.3ML SOLN   No No   Sig: Inject 0.3 mLs  Subcutaneous every 12 hours.   escitalopram (LEXAPRO) 10 MG tablet   Yes No   Sig: Take 10 mg by mouth daily.   famotidine (PEPCID) 10 MG tablet   Yes No   Sig: Take 10 mg by mouth daily. Check dosage    loratadine (CLEAR-ATADINE) 10 MG tablet   Yes No   Sig: Take 10 mg by mouth daily   magnesium 250 MG tablet   Yes No   Sig: Take 1 tablet by mouth daily   oxycodone (ROXICODONE) 5 MG immediate release tablet   No No   Sig: Take 1-2 tablets by mouth every 3 hours as needed.   senna-docusate (SENOKOT-S;PERICOLACE) 8.6-50 MG per tablet   No No   Sig: Take 1-2 tablets by mouth 2 times daily.      Facility-Administered Medications: None     Allergies   Allergies   Allergen Reactions     Food      Sesame seeds,bumps/throat swelling     Lisinopril Cough       Social History   I have reviewed this patient's social history and updated it with pertinent information if needed. Ericka Frias  reports that she has never smoked. She has never used smokeless tobacco. She reports current alcohol use. She reports that she does not use drugs.    Family History   I have reviewed this patient's family history and updated it with pertinent information if needed.   No noted hx excessive bleeding in family    Review of Systems   The 10 point Review of Systems is negative other than noted in the HPI or here.     Physical Exam   Temp: 98.7  F (37.1  C) Temp src: Oral BP: (!) 150/80 Pulse: 74   Resp: 18 SpO2: 99 % O2 Device: None (Room air)    Vital Signs with Ranges  160 lbs 0 oz    Constitutional: alert, oriented and in no acute distress  Eyes: EOMI, PERRL  HEENT: OP clear  Respiratory: CTA B without w/c  Cardiovascular: RRR no murmur. no edema.  GI: soft, nontender, nondistended, BS present  Lymph/Hematologic: no cervical LAD  Genitourinary: deferred  Skin: no rashes or lesions grossly  Musculoskeletal: no deformities or arthritis. Shoulder relocated.   Neurologic: CN II-XII, PEREA as able with fxs  Psychiatric: mood and affect  [facility]    Data   Data reviewed today:  I personally reviewed the XR imaging image(s) showing shoulder dislocation, patellar fx.  No lab results found in last 7 days.    Recent Results (from the past 24 hour(s))   CT Head w/o Contrast    Narrative    EXAM: CT HEAD W/O CONTRAST  LOCATION: [facility]  DATE/TIME: 2/24/2022 7:56 PM    INDICATION: Head trauma, minor (Age >= 65y), pain.  COMPARISON: None.  TECHNIQUE: Routine CT Head without IV contrast. Multiplanar reformats. Dose reduction techniques were used.    FINDINGS:  INTRACRANIAL CONTENTS: No intracranial hemorrhage, extraaxial collection, or mass effect.  No CT evidence of acute infarct. Normal parenchymal attenuation. Normal ventricles and sulci.     VISUALIZED ORBITS/SINUSES/MASTOIDS: No intraorbital abnormality. No paranasal sinus mucosal disease. No middle ear or mastoid effusion.    BONES/SOFT TISSUES: No acute abnormality.      Impression    IMPRESSION:  1.  Normal head CT.   XR Shoulder Right G/E 3 Views    Narrative    EXAM: XR SHOULDER RIGHT G/E 3 VIEWS  LOCATION: [facility]  DATE/TIME: 2/24/2022 8:13 PM    INDICATION: Fall, pain  COMPARISON: None.      Impression    IMPRESSION: Anterior dislocation of the right glenohumeral joint. No fractures are identified but repeat films following reduction recommended. The right AC joint is negative.    XR Knee Right 3 Views    Narrative    EXAM: XR KNEE RIGHT 3 VIEWS  LOCATION: [facility]  DATE/TIME: 2/24/2022 8:13 PM    INDICATION: Fall, pain  COMPARISON: None.      Impression    IMPRESSION: Postoperative changes of a right total knee arthroplasty. There is a fracture through the midportion of the patella with slight distraction between the superior and inferior poles. Small effusion. Prepatellar edema.   XR Pelvis w Hip Right 1 View    Narrative    EXAM: XR PELVIS AND HIP RIGHT 1 VIEW  LOCATION: [facility]  DATE/TIME: 2/24/2022 8:13 PM    INDICATION: fall, pain  COMPARISON: None.      Impression    IMPRESSION: Both hips negative for fracture or dislocation. Pelvis negative for fracture.   XR Shoulder Right Port 1 View    Narrative    EXAM: XR SHOULDER RIGHT PORT 1 VIEW  LOCATION: [facility]  DATE/TIME: 2/24/2022 8:55 PM    INDICATION: post reduction  COMPARISON: Earlier today      Impression    IMPRESSION: Unchanged anterior dislocation of the humeral head relative to the glenoid. No definite fracture is identified.

## 2022-02-25 NOTE — PLAN OF CARE
Date/time: 2/25/2022 [id number]  Trauma/ortho/medical: ortho  Diagnosis: shoulder dislocation, R patellar fracture    Mental status: A/O x 4  Diet: NPO, pre-surgical  Pain: acetaminophen, hydromorphone   Raphael/voiding: pure wick (bedfast)  O2/LDA: RA  D/C Date: unknown  Tele/restraints/iso: n/a  IV/Drains: L PIV infusing maintenance fluids   Other important info: sling on R shoulder, daughter present at bedside    Plan of Care Reviewed With: patient, daughter

## 2022-02-25 NOTE — ED NOTES
[facility]  ED Nurse Handoff Report    ED Chief complaint: Fall, Shoulder Pain, and Knee Pain      ED Diagnosis:   Final diagnoses:   Closed displaced fracture of right patella, unspecified fracture morphology, initial encounter   Shoulder dislocation, right, initial encounter   Closed head injury, initial encounter   Fall, initial encounter       Code Status: To be addressed by admitting provider    Allergies:   Allergies   Allergen Reactions     Food      Sesame seeds,bumps/throat swelling     Lisinopril Cough       Patient Story: BIBA for fall. Pt fell walking out of restaurant on the steps. Fell onto R knee and braced herself with R arm. Dislocated R shoulder and fracture to R patella. Patient's shoulder was difficult to reduce in ED with IV pain medications. Procedural sedation completed with 90mg IV propofol. Shoulder immobilizer placed.     Focused Assessment:  A&Ox4. Baseline independent with cares and ambulation. Daughter at bedside in ED. Mildly hypertensive in 150s, otherwise vss. Ortho consult needed for surgery.    Treatments and/or interventions provided:   Medications   ondansetron (ZOFRAN) injection 4 mg (4 mg Intravenous Given 2/24/22 1930)   morphine (PF) injection 2 mg (2 mg Intravenous Given 2/24/22 1930)   ondansetron (ZOFRAN) injection 4 mg (4 mg Intravenous Given 2/24/22 2045)   morphine (PF) injection 4 mg (4 mg Intravenous Given 2/24/22 2114)   propofol (DIPRIVAN) injection 10 mg/mL vial (20 mg Intravenous Given 2/24/22 2213)     Patient's response to treatments and/or interventions: decreased pain and nausea    To be done/followed up on inpatient unit:  Continue with plan of care per admitting MD.    Does this patient have any cognitive concerns?: none    Activity level - Baseline/Home:  Independent  Activity Level - Current:   Total Care    Patient's Preferred language: English   Needed?: No    Isolation: None  Infection: Not Applicable  Patient tested for COVID  19 prior to admission: YES  Bariatric?: No    Vital Signs:   Vitals:    02/24/22 2212 02/24/22 2215 02/24/22 2215 02/24/22 2220   BP:  (!) 152/100  (!) 150/75   Pulse:  75  75   Resp: 12 18 15 28   Temp:       TempSrc:       SpO2:  96%  100%   Weight:       Height:           Cardiac Rhythm:Cardiac Rhythm: Normal sinus rhythm    Was the PSS-3 completed:   Yes  What interventions are required if any?               Family Comments: daughter at bedside  OBS brochure/video discussed/provided to patient/family: N/A            For the majority of the shift this patient's behavior was Green.   Behavioral interventions performed were NA.    ED NURSE PHONE NUMBER: [phone]

## 2022-02-25 NOTE — ANESTHESIA PREPROCEDURE EVALUATION
Anesthesia Pre-Procedure Evaluation    Patient: Ericka Frias   MRN: [id number] : 1950        Procedure : Procedure(s):  OPEN REDUCTION INTERNAL FIXATION, FRACTURE, PATELLA          Past Medical History:   Diagnosis Date     Arthritis     to (R) knee and annie ft     Gastro-oesophageal reflux disease      PONV (postoperative nausea and vomiting)       Past Surgical History:   Procedure Laterality Date     ARTHROPLASTY KNEE  10/17/2011    Procedure:ARTHROPLASTY KNEE; Right Total Knee Arthroplasty ([employer])^; Surgeon:SERINA MARQUEZ; Location: OR     COLONOSCOPY N/A 2016    Procedure: COLONOSCOPY;  Surgeon: Lorenzo Pereira MD;  Location:  GI     COSMETIC SURGERY      has had tummy tuck     GYN SURGERY      removed cyst on utertus,     ORTHOPEDIC SURGERY      annie ft fractures and had pinning      Allergies   Allergen Reactions     Food      Sesame seeds,bumps/throat swelling     Lisinopril Cough      Social History     Tobacco Use     Smoking status: Never Smoker     Smokeless tobacco: Never Used   Substance Use Topics     Alcohol use: Yes     Comment: 0-5 drinks a week      Wt Readings from Last 1 Encounters:   22 72.6 kg (160 lb)        Anesthesia Evaluation   Pt has had prior anesthetic.     History of anesthetic complications  - PONV.      ROS/MED HX  ENT/Pulmonary:    (-) sleep apnea   Neurologic:       Cardiovascular:     (+) hypertension-range: controlled/ ----    METS/Exercise Tolerance:     Hematologic:       Musculoskeletal: Comment: Patellar fx and right arm shoulder injury  (+) arthritis, fracture, Fracture location: RLE,     GI/Hepatic:     (+) GERD,     Renal/Genitourinary:       Endo:       Psychiatric/Substance Use:     (+) psychiatric history anxiety     Infectious Disease:       Malignancy:       Other:            Physical Exam    Airway        Mallampati: II   TM distance: > 3 FB   Neck ROM: full   Mouth opening: > 3 cm    Respiratory Devices and Support          Dental  no notable dental history         Cardiovascular   cardiovascular exam normal          Pulmonary   pulmonary exam normal                OUTSIDE LABS:  CBC:   Lab Results   Component Value Date    WBC 15.0 (H) 02/24/2022    WBC 13.1 (H) 04/22/2011    HGB 12.5 02/24/2022    HGB 9.4 (L) 10/19/2011    HCT 36.8 02/24/2022    HCT 34.7 (L) 04/22/2011     02/24/2022     10/20/2011     BMP:   Lab Results   Component Value Date     02/24/2022     10/18/2011    POTASSIUM 3.6 02/24/2022    POTASSIUM 4.2 10/18/2011    CHLORIDE 107 02/24/2022    CHLORIDE 109 10/18/2011    CO2 23 02/24/2022    CO2 27 10/18/2011    BUN 18 02/24/2022    BUN 12 10/18/2011    CR 0.63 02/24/2022    CR 0.72 10/18/2011     (H) 02/24/2022     (H) 10/18/2011     COAGS:   Lab Results   Component Value Date    PTT 25 10/17/2011    INR 0.94 10/17/2011     POC:   Lab Results   Component Value Date     (H) 10/19/2011     HEPATIC: No results found for: ALBUMIN, PROTTOTAL, ALT, AST, GGT, ALKPHOS, BILITOTAL, BILIDIRECT, ROBIN  OTHER:   Lab Results   Component Value Date    KACI 8.3 (L) 02/24/2022       Anesthesia Plan    ASA Status:  2   NPO Status:  NPO Appropriate    Anesthesia Type: General.     - Airway: LMA   Induction: Intravenous.   Maintenance: Balanced.        Consents    Anesthesia Plan(s) and associated risks, benefits, and realistic alternatives discussed. Questions answered and patient/representative(s) expressed understanding.    - Discussed:     - Discussed with:  Patient         Postoperative Care    Pain management: Peripheral nerve block (Single Shot), Multi-modal analgesia, IV analgesics.   PONV prophylaxis: Ondansetron (or other 5HT-3), Background Propofol Infusion, Dexamethasone or Solumedrol     Comments:    Other Comments: H&P reviewed    Decadron 10mg, zofran, propofol gtt  Minimize/avoid opioid            KENDRICK ALEGRIA MD

## 2022-02-26 ENCOUNTER — APPOINTMENT (OUTPATIENT)
Dept: PHYSICAL THERAPY | Facility: CLINIC | Age: 72
DRG: 516 | End: 2022-02-26
Attending: PHYSICIAN ASSISTANT
Payer: MEDICARE

## 2022-02-26 LAB
ANION GAP SERPL CALCULATED.3IONS-SCNC: 3 MMOL/L (ref 3–14)
BUN SERPL-MCNC: 13 MG/DL (ref 7–30)
CALCIUM SERPL-MCNC: 8.6 MG/DL (ref 8.5–10.1)
CHLORIDE BLD-SCNC: 108 MMOL/L (ref 94–109)
CO2 SERPL-SCNC: 26 MMOL/L (ref 20–32)
CREAT SERPL-MCNC: 0.7 MG/DL (ref 0.52–1.04)
ERYTHROCYTE [DISTWIDTH] IN BLOOD BY AUTOMATED COUNT: 11.6 % (ref 10–15)
GFR SERPL CREATININE-BSD FRML MDRD: >90 ML/MIN/1.73M2
GLUCOSE BLD-MCNC: 114 MG/DL (ref 70–99)
GLUCOSE BLDC GLUCOMTR-MCNC: 116 MG/DL (ref 70–99)
HCT VFR BLD AUTO: 33.9 % (ref 35–47)
HGB BLD-MCNC: 11.1 G/DL (ref 11.7–15.7)
MCH RBC QN AUTO: 30.6 PG (ref 26.5–33)
MCHC RBC AUTO-ENTMCNC: 32.7 G/DL (ref 31.5–36.5)
MCV RBC AUTO: 93 FL (ref 78–100)
PLATELET # BLD AUTO: 197 10E3/UL (ref 150–450)
POTASSIUM BLD-SCNC: 4.3 MMOL/L (ref 3.4–5.3)
RBC # BLD AUTO: 3.63 10E6/UL (ref 3.8–5.2)
SODIUM SERPL-SCNC: 137 MMOL/L (ref 133–144)
WBC # BLD AUTO: 8.4 10E3/UL (ref 4–11)

## 2022-02-26 PROCEDURE — 120N000001 HC R&B MED SURG/OB

## 2022-02-26 PROCEDURE — 250N000013 HC RX MED GY IP 250 OP 250 PS 637: Performed by: PHYSICIAN ASSISTANT

## 2022-02-26 PROCEDURE — 99232 SBSQ HOSP IP/OBS MODERATE 35: CPT | Performed by: HOSPITALIST

## 2022-02-26 PROCEDURE — 82310 ASSAY OF CALCIUM: CPT | Performed by: PHYSICIAN ASSISTANT

## 2022-02-26 PROCEDURE — 85027 COMPLETE CBC AUTOMATED: CPT | Performed by: PHYSICIAN ASSISTANT

## 2022-02-26 PROCEDURE — 97530 THERAPEUTIC ACTIVITIES: CPT | Mod: GP

## 2022-02-26 PROCEDURE — 97116 GAIT TRAINING THERAPY: CPT | Mod: GP

## 2022-02-26 PROCEDURE — 36415 COLL VENOUS BLD VENIPUNCTURE: CPT | Performed by: PHYSICIAN ASSISTANT

## 2022-02-26 PROCEDURE — 97161 PT EVAL LOW COMPLEX 20 MIN: CPT | Mod: GP

## 2022-02-26 PROCEDURE — 250N000011 HC RX IP 250 OP 636: Performed by: PHYSICIAN ASSISTANT

## 2022-02-26 RX ADMIN — SENNOSIDES AND DOCUSATE SODIUM 1 TABLET: 50; 8.6 TABLET ORAL at 22:09

## 2022-02-26 RX ADMIN — Medication 50 MCG: at 08:40

## 2022-02-26 RX ADMIN — ESCITALOPRAM OXALATE 20 MG: 20 TABLET ORAL at 08:40

## 2022-02-26 RX ADMIN — OXYCODONE HYDROCHLORIDE 10 MG: 5 TABLET ORAL at 00:06

## 2022-02-26 RX ADMIN — OXYCODONE HYDROCHLORIDE 5 MG: 5 TABLET ORAL at 22:10

## 2022-02-26 RX ADMIN — ACETAMINOPHEN 975 MG: 325 TABLET, FILM COATED ORAL at 16:39

## 2022-02-26 RX ADMIN — ACETAMINOPHEN 975 MG: 325 TABLET, FILM COATED ORAL at 07:05

## 2022-02-26 RX ADMIN — OXYCODONE HYDROCHLORIDE 5 MG: 5 TABLET ORAL at 04:25

## 2022-02-26 RX ADMIN — OXYCODONE HYDROCHLORIDE 5 MG: 5 TABLET ORAL at 17:30

## 2022-02-26 RX ADMIN — ASPIRIN 325 MG: 325 TABLET, COATED ORAL at 08:40

## 2022-02-26 RX ADMIN — AMLODIPINE BESYLATE 10 MG: 10 TABLET ORAL at 08:40

## 2022-02-26 RX ADMIN — SENNOSIDES AND DOCUSATE SODIUM 1 TABLET: 50; 8.6 TABLET ORAL at 08:39

## 2022-02-26 RX ADMIN — Medication 1 TABLET: at 08:39

## 2022-02-26 RX ADMIN — IBUPROFEN 600 MG: 600 TABLET ORAL at 11:49

## 2022-02-26 RX ADMIN — POLYETHYLENE GLYCOL 3350 17 G: 17 POWDER, FOR SOLUTION ORAL at 08:39

## 2022-02-26 RX ADMIN — OXYCODONE HYDROCHLORIDE 5 MG: 5 TABLET ORAL at 08:40

## 2022-02-26 RX ADMIN — CEFAZOLIN 1 G: 1 INJECTION, POWDER, FOR SOLUTION INTRAMUSCULAR; INTRAVENOUS at 00:08

## 2022-02-26 RX ADMIN — CEFAZOLIN 1 G: 1 INJECTION, POWDER, FOR SOLUTION INTRAMUSCULAR; INTRAVENOUS at 08:39

## 2022-02-26 RX ADMIN — ONDANSETRON 4 MG: 4 TABLET, ORALLY DISINTEGRATING ORAL at 00:06

## 2022-02-26 RX ADMIN — OXYCODONE HYDROCHLORIDE 5 MG: 5 TABLET ORAL at 12:57

## 2022-02-26 ASSESSMENT — ACTIVITIES OF DAILY LIVING (ADL)
ADLS_ACUITY_SCORE: 16
ADLS_ACUITY_SCORE: 18
ADLS_ACUITY_SCORE: 16
ADLS_ACUITY_SCORE: 18
ADLS_ACUITY_SCORE: 16
ADLS_ACUITY_SCORE: 16

## 2022-02-26 NOTE — ANESTHESIA POSTPROCEDURE EVALUATION
Patient: Ericka Frias    Procedure: Procedure(s):  OPEN REDUCTION INTERNAL FIXATION, FRACTURE, PATELLA       Anesthesia Type:  General    Note:  Disposition: Inpatient   Postop Pain Control: Uneventful            Sign Out: Well controlled pain   PONV: No   Neuro/Psych: Uneventful            Sign Out: Acceptable/Baseline neuro status   Airway/Respiratory: Uneventful            Sign Out: Acceptable/Baseline resp. status   CV/Hemodynamics: Uneventful            Sign Out: Acceptable CV status; No obvious hypovolemia; No obvious fluid overload   Other NRE: NONE   DID A NON-ROUTINE EVENT OCCUR? No           Last vitals:  Vitals Value Taken Time   /70 02/25/22 1920   Temp     Pulse 80 02/25/22 1927   Resp 12 02/25/22 1927   SpO2 92 % 02/25/22 1929   Vitals shown include unvalidated device data.    Electronically Signed By: Raul Hedrick MD  February 25, 2022  8:24 PM

## 2022-02-26 NOTE — PROGRESS NOTES
02/26/22 1100   Quick Adds   Type of Visit Initial PT Evaluation   Living Environment   People in Home spouse   Current Living Arrangements house   Home Accessibility stairs to enter home;stairs within home   Number of Stairs, Main Entrance 2   Stair Railings, Main Entrance none   Number of Stairs, Within Home, Primary other (see comments)  (full flight upstairs and to basement)   Stair Railings, Within Home, Primary railing on left side (ascending)   Transportation Anticipated family or friend will provide   Living Environment Comments 2 story house and basement with . Laundry, bathroom, kitchen on main floor; can sleep on main floor for time being but bedroom is upstairs.  is home 24/7 but has multiple mylenoma and limited lifting ability. Walk in shower upstairs and in basement.   Self-Care   Usual Activity Tolerance good   Current Activity Tolerance fair   Regular Exercise No   Equipment Currently Used at Home none   Fall history within last six months yes   Number of times patient has fallen within last six months 1   Activity/Exercise/Self-Care Comment IND with all ADLs/IADLs at baseline.    General Information   Onset of Illness/Injury or Date of Surgery 02/24/22   Referring Physician Catie Calderón PA-C   Patient/Family Therapy Goals Statement (PT) return home   Pertinent History of Current Problem (include personal factors and/or comorbidities that impact the POC) Ericka Frias is a 72 year old female who presented to the ER following a fall which results in shoulder and knee pain. She was found to have a right shoulder dislocation and right patella fracture. The hospitalist service was contacted to admit her for further evaluation and management. Underwent R patella ORIF on 2/25/22.   Existing Precautions/Restrictions weight bearing   Weight-Bearing Status - LUE full weight-bearing   Weight-Bearing Status - RUE other (see comments)  (R shoulder dislocation, in sling)    Weight-Bearing Status - LLE full weight-bearing   Weight-Bearing Status - RLE weight-bearing as tolerated   Cognition   Affect/Mental Status (Cognition) WFL   Orientation Status (Cognition) oriented x 3   Pain Assessment   Patient Currently in Pain Yes, see Vital Sign flowsheet  (more in the R arm)   Integumentary/Edema   Integumentary/Edema other (describe)   Integumentary/Edema Comments knee immobilizer over surgical LE   Posture    Posture Forward head position;Protracted shoulders   Range of Motion (ROM)   Range of Motion ROM deficits secondary to surgical procedure;ROM deficits secondary to pain   ROM Comment unable to do RUE ROM d/t sling, R knee ROM limited s/p surgery   Bed Mobility   Comment, (Bed Mobility) supine to sit with strong use of bedrails and CGA   Transfers   Comment, (Transfers) sit to stand with CGA   Gait/Stairs (Locomotion)   Manning Level (Gait) minimum assist (75% patient effort)   Assistive Device (Gait) cane, straight   Distance in Feet (Required for LE Total Joints) 10'   Pattern (Gait) step-through   Deviations/Abnormal Patterns (Gait) antalgic;stride length decreased;gait speed decreased;weight shifting decreased   Negotiation (Stairs) stairs independence;stairs assistive device;handrail location;number of steps;ascending technique;descending technique   Manning Level (Stairs) minimum assist (75% patient effort)   Handrail Location (Stairs) both sides   Number of Steps (Stairs) 4   Ascending Technique (Stairs) step-to-step   Descending Technique (Stairs) step-to-step   Comment, (Gait/Stairs) gait with min A and SPC, 2 LOB with min A to correct   Balance   Balance other (describe)   Balance Comments good sitting balance, fair standing balance, requiring UE support and assist   Clinical Impression   Criteria for Skilled Therapeutic Intervention Yes, treatment indicated   PT Diagnosis (PT) impaired functional mobility   Influenced by the following impairments R shoulder  dislocation, R knee post-op, muscle weakness, pain, impaired ROM, decreased activity tolerance, impaired balance   Functional limitations due to impairments difficulty with bed mobility, transfers, ambulation, and stairs   Clinical Presentation (PT Evaluation Complexity) Evolving/Changing   Clinical Presentation Rationale clinical judgement   Clinical Decision Making (Complexity) low complexity   Planned Therapy Interventions (PT) balance training;bed mobility training;gait training;home exercise program;neuromuscular re-education;patient/family education;ROM (range of motion);stair training;strengthening;transfer training   Anticipated Equipment Needs at Discharge (PT) other (see comments)  (consider edwin walker or SPC)   Risk & Benefits of therapy have been explained evaluation/treatment results reviewed;care plan/treatment goals reviewed;risks/benefits reviewed;current/potential barriers reviewed;participants voiced agreement with care plan;participants included;patient   PT Discharge Planning   PT Discharge Recommendation (DC Rec) home with assist;home with home care physical therapy;home with outpatient physical therapy;Transitional Care Facility   PT Rationale for DC Rec Patient is currently significantly below baseline functional mobility. She is signficantly limited by right shoulder pain and right knee pain, in addition to impaired ROM in the knee and shoulder. Patient demonstrating muscle weakness, impaired balance, and decreased functional mobility. Patient is requiring assist for all functional mobility at this time, also demonstrating 2 LOB with ambulation this date. Patient is not safe at this time to discharge home. Anticipate with medical clearance and further progression of safe functional mobility with therapy that she may be appropriate for discharge to home with 24/7 assist of spouse and appropriate AD. Otherwise may consider placement.   PT Brief overview of current status CGA bed mobility, CGA  transfers, min A ambulation, min A stairs   Plan of Care Review   Plan of Care Reviewed With patient   Total Evaluation Time   Total Evaluation Time (Minutes) 8   Physical Therapy Goals   PT Frequency Daily   PT Predicated Duration/Target Date for Goal Attainment 02/28/22   PT Goals Bed Mobility;Transfers;Gait;Stairs   PT: Bed Mobility Modified independent   PT: Transfers Modified independent;Assistive device   PT: Gait Modified independent;Assistive device;100 feet   PT: Stairs 2 stairs;Assistive device;Supervision/stand-by assist

## 2022-02-26 NOTE — PROGRESS NOTES
[facility]    Medicine Progress Note - Hospitalist Service    Date of Admission:  2/24/2022    Assessment & Plan        Ericka Frias is a 72 year old female who presented to the ER following a fall which results in shoulder and knee pain. She was found to have a right shoulder dislocation and right patella fracture. The hospitalist service was contacted to admit her for further evaluation and management.    Right patella fracture  Right shoulder dislocation  History of right TKA ~10 years ago  She was walking outside a restaurant when she had a mechanical fall while descending stair. She landed on R side injuring knee and shoulder. ? Head trauma. No LOC. Vitals stable on admission, mildly hypertensive. Imaging with dislocation of R shoulder. Patellar fracture. No hip fracture. CT head normal.   - Orthopedic surgery consult appreciated. Pt went to OR for ORIF 2/25  - EKG OK. Labs OK except mildly elevated WBC as noted below.  - As needed pain medications available.  - She is normally pretty active at baseline and denies any chest pain or shortness of breath with activity. No further work-up/intervention needed from a hospitalist standpoint prior to proceeding with surgery.    Leukocytosis  Suspect reactive. Afebrile. UA Negative.  - Resolved    Hypertension   - Continue PTA amlodipine 10 mg daily.  - Hold PTA losartan 25 mg daily for now, reassess post-operatively.  - PRN labetalol and hydralazine available for significantly elevated blood pressures.    Anxiety  - Continue PTA escitalopram 20 mg daily and xanax 0.25 nightly PRN.    GERD  - Continue PTA PRN famotidine.    COVID-19 PCR negative  - Routine admission COVID-19 PCR testing was negative on 2/24/22.       Diet: Advance Diet as Tolerated: Regular Diet Adult    DVT Prophylaxis: Pneumatic Compression Devices  Raphael Catheter: Not present  Central Lines: None  Cardiac Monitoring: None  Code Status: Full Code      Disposition Plan  "  Expected Discharge: 02/26/2022     Anticipated discharge location:  Awaiting care coordination huddle  Delays:            The patient's care was discussed with the Bedside Nurse and Patient.    Lesli Madison MD  Hospitalist Service  [facility]  Securely message with the Vocera Web Console (learn more here)  Text page via [employer] Paging/Directory         Clinically Significant Risk Factors Present on Admission              # Overweight: Estimated body mass index is 29.15 kg/m  as calculated from the following:    Height as of this encounter: 1.578 m (5' 2.13\").    Weight as of this encounter: 72.6 kg (160 lb).      ______________________________________________________________________    Interval History   Patient is 1 day post op. Doing relatively well. Pain controlled. She feels relaxed.    Data reviewed today: I reviewed all medications, new labs and imaging results over the last 24 hours. I personally reviewed no images or EKG's today.    Physical Exam   Vital Signs: Temp: 98.2  F (36.8  C) Temp src: Oral BP: 128/66 Pulse: 73   Resp: 16 SpO2: 95 % O2 Device: Nasal cannula Oxygen Delivery: 2 LPM  Weight: 160 lbs 0 oz  Constitutional: awake, alert, cooperative, no apparent distress, laying in the hospital bed  Respiratory: clear to auscultation bilaterally, no crackles or wheezing  Cardiovascular: regular rate and rhythm, normal S1 and S2, no murmur noted  GI: normal bowel sounds, soft, non-distended, non-tender  Skin: warm, dry  Musculoskeletal: no lower extremity pitting edema present, right knee tender, right shoulder tender  Neurologic: awake, alert, answers questions appropriately, moves all extremities    Data   Recent Labs   Lab 02/26/22  0751 02/26/22  0656 02/24/22  2220   WBC 8.4  --  15.0*   HGB 11.1*  --  12.5   MCV 93  --  91     --  233     --  135   POTASSIUM 4.3  --  3.6   CHLORIDE 108  --  107   CO2 26  --  23   BUN 13  --  18   CR 0.70  --  0.63 "   ANIONGAP 3  --  5   KACI 8.6  --  8.3*   * 116* 183*     Recent Results (from the past 24 hour(s))   XR Surgery VASYL L/T 5 Min Fluoro w Gael    Narrative    EXAM: XR SURGERY C-ARM FLUORO LESS THAN 5 MIN W GAEL  LOCATION: [facility]  DATE/TIME: 2/25/2022 5:30 PM    INDICATION: ORIF right patella  COMPARISON: None.  TECHNIQUE: Exam performed by surgeon.    FINDINGS:    AP and lateral projection of the right knee shows anatomic alignment of total knee arthroplasty.     FLUOROSCOPIC TIME: 0.4  NUMBER OF IMAGES: 2      Impression    IMPRESSION:  1.  Fluoroscopic guidance provided for assessment of right knee total arthroplasty showing anatomic alignment.     Medications     lactated ringers Stopped (02/26/22 0425)     sodium chloride 100 mL/hr at 02/25/22 1050       acetaminophen  975 mg Oral Q8H     amLODIPine  10 mg Oral Daily     aspirin  325 mg Oral Daily     calcium carbonate-vitamin D  1 tablet Oral Daily     escitalopram  20 mg Oral Daily     folic acid  400 mcg Oral Daily     multivitamin w/minerals  1 tablet Oral Daily     polyethylene glycol  17 g Oral Daily     senna-docusate  1 tablet Oral BID     sodium chloride (PF)  3 mL Intracatheter Q8H     vitamin D3  50 mcg Oral Daily     vitamin E  400 Units Oral Daily

## 2022-02-26 NOTE — ANESTHESIA CARE TRANSFER NOTE
Patient: Ericka Frias    Procedure: Procedure(s):  OPEN REDUCTION INTERNAL FIXATION, FRACTURE, PATELLA       Diagnosis: Closed displaced transverse fracture of right patella, initial encounter [S82.031A]  Diagnosis Additional Information: No value filed.    Anesthesia Type:   General     Note:    Oropharynx: oropharynx clear of all foreign objects  Level of Consciousness: awake  Oxygen Supplementation: face mask    Independent Airway: airway patency satisfactory and stable  Dentition: dentition unchanged  Vital Signs Stable: post-procedure vital signs reviewed and stable  Report to RN Given: handoff report given  Patient transferred to: PACU          Vitals:  Vitals Value Taken Time   BP     Temp     Pulse 93 02/25/22 1841   Resp 13 02/25/22 1841   SpO2 92 % 02/25/22 1841   Vitals shown include unvalidated device data.    Electronically Signed By: NIKOLAY Santiago CRNA  February 25, 2022  6:42 PM  
[id number]  [address] [place] [address], [place] [address]

## 2022-02-26 NOTE — OP NOTE
[facility]   Operative Note    Pre-operative diagnosis:  Right displaced periprosthetic patella fracture   Post-operative diagnosis Same   Procedure:  Open reduction internal fixation of right periprosthetic patella fracture   Surgeon(s): Surgeon(s) and Role:     * Sukhdeep Smith MD - Primary     * Catie Calderón PA-C - Assisting, the PA was present for the entirety of the case and was essential for patient positioning, soft tissue retraction, wound closure, bandage placement, and patient transport.   Estimated blood loss:  25 mL    Specimens: * No specimens in log *   Findings:  Displaced patella fracture with a stable patellar component     Indications: This is a 72-year-old female who unfortunately sustained a fall and landed onto her right knee.  She had previously undergone a total knee arthroplasty by Dr. Babak Orourke.  She did quite well with her total knee arthroplasty.  I had a long discussion with Lisa regarding her patella fracture.  She had no extensor mechanism intact.  We had a long discussion regarding conservative and surgical treatment options.  We did discuss the risks of surgery.  Her most substantial risks include infection, wound healing problems, nonunion, malunion, need for further surgery, hardware failure, hardware symptoms, blood clots, blood loss, neurovascular injury, and anesthesia related complications.  After discussing all the details of the surgery she elected to undergo the above-mentioned procedure.    Description of procedure:   The patient was met in the preoperative area and the operative site, the right knee, was signed by myself.  Preoperative antibiotics were given.  The patient was brought back to the operating room and was placed in the supine position on the operating table.  All bony prominences were padded at this time.  She then underwent general anesthesia.  She was then prepped and draped in normal sterile fashion.  A timeout  was then called ensuring we are operating on the correct site and the correct patient.  All staff concerns were addressed at this time.  Following the timeout an incision was made through her prior incision site for her total knee arthroplasty and dissection was carried down through the skin and subcutaneous tissue.  We then encountered the patella fracture and this was cleaned out using suction and irrigation.  Once we had removed any periosteum within the fracture site we used a point-to-point reduction clamp to reduce the fracture.  We then placed 2 guidewires from distal to proximal across the fracture site.  We then measured drilled and placed two 4.0 mm partially-threaded cannulated screws across the patella fracture.  We then took the knee through range of motion exercises and it was nice and stable.  We then used a #2 FiberWire suture to oversew soft tissue on the anterior aspect of the patella.  We then brought in C arm to confirm positioning of all of our hardware and appropriate reduction of our patella.  We then thoroughly irrigated out the wound and closed in a layered fashion using 2-0 Vicryl and staples.  Sterile bandages were placed and the patient she was placed into a knee immobilizer.  She was then transferred off of the operating room table and brought back to postanesthesia care unit.    All counts were correct at the end the case.    Postoperative plan: The patient will be weightbearing as tolerated on the right lower extremity with her knee in full extension in a knee immobilizer or in a hinged knee brace locked in extension.  We will order her a hinged knee brace to be locked in extension as this will allow her to rehab in the coming weeks.  She will maintain the knee brace locked in extension for 6 weeks.  She will be on aspirin 325 daily for DVT prophylaxis.

## 2022-02-26 NOTE — PLAN OF CARE
Goal Outcome Evaluation:    Plan of Care Reviewed With: patient     Patient vital signs are at baseline: Yes  Patient able to ambulate as they were prior to admission or with assist devices provided by therapies during their stay:  No,  Reason:  PT schedule in AM  Patient MUST void prior to discharge:  Yes  Patient able to tolerate oral intake:  Yes  Pain has adequate pain control using Oral analgesics:  Yes    Pt is A&O x4. VSS on 2L O2. CMS intact. Assist of 1 with gait belt to BSC. Denies pain on knee, pain on right shoulder. Sling on RUE. Knee immobilizer in place. Dressing on RLE CDI. Abdominal binder adjusted for pt comfort. LR infusing @ 100 ml/hr.

## 2022-02-26 NOTE — PLAN OF CARE
Patient vital signs are at baseline: Yes  Patient able to ambulate as they were prior to admission or with assist devices provided by therapies during their stay:  No,  Reason:  PT schedule in AM  Patient MUST void prior to discharge:  Yes  Patient able to tolerate oral intake:  Yes  Pain has adequate pain control using Oral analgesics:  Yes    Pt is A&O x4. VSS on 2L O2. Assist of 1 with gait belt. Used bedpan.Has pain on knee, pain on right shoulder. Oxycodone given x 2. Zofran given x1. Dressing CDI. LR stopped at 4.

## 2022-02-26 NOTE — PROGRESS NOTES
Orthopedic Surgery  Ericka Frias  2022  Admit Date:  2022  POD 1 Day Post-Op  S/P Procedure(s):  OPEN REDUCTION INTERNAL FIXATION, FRACTURE, PATELLA   S/p closed reduction right shoulder dislocation.     Patient is resting comfortably in bed.  Pain is controlled.  No nausea.   Pt is anxious getting up, feels unstable in the knee immobilizer.     Alert and orient to person, place, and time.  Vital Sign Ranges  Temperature Temp  Av.8  F (37.1  C)  Min: 98.2  F (36.8  C)  Max: 99.7  F (37.6  C)   Blood pressure Systolic (24hrs), Av , Min:128 , Max:174        Diastolic (24hrs), Av, Min:58, Max:84      Pulse Pulse  Av.3  Min: 73  Max: 93   Respirations Resp  Avg: 15.7  Min: 12  Max: 18   Pulse oximetry SpO2  Av.9 %  Min: 91 %  Max: 96 %       Dressing/Ace is clean, dry, and intact. Knee immobilizer in place.    Bilateral calves are soft, non-tender.  Shoulder immobilizer in place.   Sensation and motor of fingers intact. Radial pulse intact.   Right lower extremity is NVI.  Dorsiflexion and plantarflexion intact.     Labs:  Recent Labs   Lab Test 22  0751 22  2220   POTASSIUM 4.3 3.6     Recent Labs   Lab Test 22  0751 22  2220   HGB 11.1* 12.5     No results for input(s): INR in the last 61835 hours.  Recent Labs   Lab Test 22  0751 22  2220    233       A/P  1. S/p ORIF Perioprosthetic patella fracture,   S/p right native shoulder dislocation with reduction in ED   Activity: WBAT in KI. She feels unstable in KI. Will see about locked. hinge knee brace   Continue with shoulder immobilizer. NWB RUE   Continue ASA for DVT prophylaxis.     Mobilize with PT/OT   Continue current pain regimen.    2. Disposition   Anticipate discharge to home tomorrow pending progress with PT and medical stability.    Shannon MAX  [facility]  [phone]

## 2022-02-26 NOTE — PLAN OF CARE
Goal Outcome Evaluation:    Plan of Care Reviewed With: patient     Patient vital signs are at baseline: Yes  Patient able to ambulate as they were prior to admission or with assist devices provided by therapies during their stay:  No,  Reason:  Mobility significantly below baseline at this time, PT to re-evaluate pt tomorrow  Patient MUST void prior to discharge:  Yes, bed pan or A1-2 w/ gait belt to bedside commode  Patient able to tolerate oral intake:  Yes  Pain has adequate pain control using Oral analgesics:  Yes

## 2022-02-26 NOTE — ANESTHESIA PROCEDURE NOTES
Airway       Patient location during procedure: OR  Staff -        Performed By: CRNAIndications and Patient Condition       Indications for airway management: yoshi-procedural and airway protection         Mask difficulty assessment: 0 - not attempted    Final Airway Details       Final airway type: supraglottic airway    Supraglottic Airway Details        Type: LMA       Brand: [employer]       LMA size: 4    Post intubation assessment        Placement verified by: capnometry and chest rise        Number of attempts at approach: 1       Number of other approaches attempted: 0       Secured with: pink tape       Ease of procedure: easy       Dentition: Intact and Unchanged

## 2022-02-26 NOTE — PROGRESS NOTES
S:  Ericka was seen at the [facility] in room 2311 for a right knee orthosis as requested through the on-call practitioner.  O:  Sheila was awake and sitting up when I arrived and in good spirits.    A:  She was fit with right Breg T-scope KO.  The brace was adjusted for the leg and thigh length and the straps were all adjusted for her leg and thigh circumference.  The joints were locked into full extension.  Proper donning, doffing and care of the brace was discussed in depth with the patient.    P:  They understands that their doctor will determine when and how long they will wear the brace in the future.   Any issues with the brace should be referred to the [facility] department.      ANANT Mandel, ATC

## 2022-02-27 ENCOUNTER — APPOINTMENT (OUTPATIENT)
Dept: OCCUPATIONAL THERAPY | Facility: CLINIC | Age: 72
DRG: 516 | End: 2022-02-27
Attending: PHYSICIAN ASSISTANT
Payer: MEDICARE

## 2022-02-27 ENCOUNTER — APPOINTMENT (OUTPATIENT)
Dept: PHYSICAL THERAPY | Facility: CLINIC | Age: 72
DRG: 516 | End: 2022-02-27
Payer: MEDICARE

## 2022-02-27 LAB
GLUCOSE BLDC GLUCOMTR-MCNC: 104 MG/DL (ref 70–99)
HGB BLD-MCNC: 10.8 G/DL (ref 11.7–15.7)

## 2022-02-27 PROCEDURE — 97535 SELF CARE MNGMENT TRAINING: CPT | Mod: GO

## 2022-02-27 PROCEDURE — 85018 HEMOGLOBIN: CPT | Performed by: PHYSICIAN ASSISTANT

## 2022-02-27 PROCEDURE — 250N000013 HC RX MED GY IP 250 OP 250 PS 637: Performed by: PHYSICIAN ASSISTANT

## 2022-02-27 PROCEDURE — 36415 COLL VENOUS BLD VENIPUNCTURE: CPT | Performed by: PHYSICIAN ASSISTANT

## 2022-02-27 PROCEDURE — 97530 THERAPEUTIC ACTIVITIES: CPT | Mod: GO

## 2022-02-27 PROCEDURE — 97116 GAIT TRAINING THERAPY: CPT | Mod: GP | Performed by: PHYSICAL THERAPY ASSISTANT

## 2022-02-27 PROCEDURE — 97165 OT EVAL LOW COMPLEX 30 MIN: CPT | Mod: GO

## 2022-02-27 PROCEDURE — 99233 SBSQ HOSP IP/OBS HIGH 50: CPT | Performed by: HOSPITALIST

## 2022-02-27 PROCEDURE — 97530 THERAPEUTIC ACTIVITIES: CPT | Mod: GP | Performed by: PHYSICAL THERAPY ASSISTANT

## 2022-02-27 PROCEDURE — 250N000011 HC RX IP 250 OP 636: Performed by: PHYSICIAN ASSISTANT

## 2022-02-27 PROCEDURE — 120N000001 HC R&B MED SURG/OB

## 2022-02-27 RX ORDER — ASPIRIN 325 MG
325 TABLET, DELAYED RELEASE (ENTERIC COATED) ORAL DAILY
Qty: 30 TABLET | Refills: 1 | Status: SHIPPED | OUTPATIENT
Start: 2022-02-28

## 2022-02-27 RX ORDER — ONDANSETRON 2 MG/ML
4 INJECTION INTRAMUSCULAR; INTRAVENOUS ONCE
Status: DISCONTINUED | OUTPATIENT
Start: 2022-02-28 | End: 2022-02-28 | Stop reason: HOSPADM

## 2022-02-27 RX ORDER — OXYCODONE HYDROCHLORIDE 5 MG/1
5-10 TABLET ORAL EVERY 4 HOURS PRN
Qty: 25 TABLET | Refills: 0 | Status: SHIPPED | OUTPATIENT
Start: 2022-02-27

## 2022-02-27 RX ORDER — ONDANSETRON 4 MG/1
4 TABLET, ORALLY DISINTEGRATING ORAL EVERY 6 HOURS PRN
Qty: 12 TABLET | Refills: 0 | Status: SHIPPED | OUTPATIENT
Start: 2022-02-27

## 2022-02-27 RX ORDER — ACETAMINOPHEN 325 MG/1
650 TABLET ORAL EVERY 6 HOURS PRN
COMMUNITY
Start: 2022-02-27

## 2022-02-27 RX ADMIN — ESCITALOPRAM OXALATE 20 MG: 20 TABLET ORAL at 09:28

## 2022-02-27 RX ADMIN — OXYCODONE HYDROCHLORIDE 5 MG: 5 TABLET ORAL at 12:47

## 2022-02-27 RX ADMIN — OXYCODONE HYDROCHLORIDE 5 MG: 5 TABLET ORAL at 03:08

## 2022-02-27 RX ADMIN — SENNOSIDES AND DOCUSATE SODIUM 1 TABLET: 50; 8.6 TABLET ORAL at 09:27

## 2022-02-27 RX ADMIN — OXYCODONE HYDROCHLORIDE 5 MG: 5 TABLET ORAL at 23:20

## 2022-02-27 RX ADMIN — ONDANSETRON 4 MG: 4 TABLET, ORALLY DISINTEGRATING ORAL at 09:17

## 2022-02-27 RX ADMIN — AMLODIPINE BESYLATE 10 MG: 10 TABLET ORAL at 09:28

## 2022-02-27 RX ADMIN — OXYCODONE HYDROCHLORIDE 5 MG: 5 TABLET ORAL at 18:55

## 2022-02-27 RX ADMIN — ACETAMINOPHEN 975 MG: 325 TABLET, FILM COATED ORAL at 03:08

## 2022-02-27 RX ADMIN — ACETAMINOPHEN 975 MG: 325 TABLET, FILM COATED ORAL at 09:28

## 2022-02-27 RX ADMIN — ASPIRIN 325 MG: 325 TABLET, COATED ORAL at 09:28

## 2022-02-27 RX ADMIN — ACETAMINOPHEN 975 MG: 325 TABLET, FILM COATED ORAL at 16:55

## 2022-02-27 RX ADMIN — POLYETHYLENE GLYCOL 3350 17 G: 17 POWDER, FOR SOLUTION ORAL at 09:27

## 2022-02-27 RX ADMIN — OXYCODONE HYDROCHLORIDE 5 MG: 5 TABLET ORAL at 07:40

## 2022-02-27 ASSESSMENT — ACTIVITIES OF DAILY LIVING (ADL)
ADLS_ACUITY_SCORE: 16

## 2022-02-27 NOTE — PROGRESS NOTES
"Orthopedic Surgery  Ericka Frias  Admit Date:  2/24/2022  POD 2 Days Post-Op  S/P Procedure(s):  OPEN REDUCTION INTERNAL FIXATION, FRACTURE, PATELLA   S/p closed reduction right shoulder dislocation.     Patient is resting comfortably in bed.  Pain is controlled.  She reports some nausea this AM.   She reports she is doing better. She feels more stable in the hinge knee brace. She is nervous about going home.     Alert and orient to person, place, and time.  Vital Sign Ranges  /52 (BP Location: Left arm)   Pulse 73   Temp 98.8  F (37.1  C) (Oral)   Resp 16   Ht 1.578 m (5' 2.13\")   Wt 72.6 kg (160 lb)   SpO2 96%   BMI 29.15 kg/m      Dressing/Ace is clean, dry, and intact. Hinge knee brace in place.    Bilateral calves are soft, non-tender.  Shoulder immobilizer in place.   Sensation and motor of fingers intact. Radial pulse intact.   Right lower extremity is NVI.  Dorsiflexion and plantarflexion intact.     Labs:  Hemoglobin   Date Value Ref Range Status   02/27/2022 10.8 (L) 11.7 - 15.7 g/dL Final   10/19/2011 9.4 (L) 11.7 - 15.7 g/dL Final   ]      A/P  1. S/p ORIF Perioprosthetic patella fracture,   S/p right native shoulder dislocation with reduction in ED   Activity: WBAT in locked in extension hinge knee brace   Continue with shoulder immobilizer. NWB RUE   Continue ASA for DVT prophylaxis.     Mobilize with PT/OT   Continue current pain regimen.    2. Disposition   Anticipate discharge to home tomorrow pending progress with PT and medical stability.    Shannon MAX  [facility]  [phone]    "

## 2022-02-27 NOTE — PLAN OF CARE
Goal Outcome Evaluation:    Patient vital signs are at baseline: Yes  Patient able to ambulate as they were prior to admission or with assist devices provided by therapies during their stay:  Yes, A1, edwin-walker and gait belt  Patient MUST void prior to discharge:  Yes, bed pan or A1-2 w/ gait belt to bedside commode  Patient able to tolerate oral intake:  Yes  Pain has adequate pain control using Oral analgesics:  Yes    Plan to discharge on Monday, to home with Home PT per MD notes. Daughter will be here on Monday by 8 am, relayed to PT staff.

## 2022-02-27 NOTE — PLAN OF CARE
Goal Outcome Evaluation:    Plan of Care Reviewed With: patient      Patient vital signs are at baseline: Yes  Patient able to ambulate as they were prior to admission or with assist devices provided by therapies during their stay:  Yes  Patient MUST void prior to discharge:  Yes  Patient able to tolerate oral intake:  Yes  Pain has adequate pain control using Oral analgesics:  Yes    A&O x4. Vss on RA. IV-SL. Assist of 1 with gait belt. Voiding in bsc and bp. Pain on right shoulder. Sling in place. Knee immobilizer in place. Dressing CDI. Pain managed with prn oxycodone.

## 2022-02-27 NOTE — PROGRESS NOTES
Care Management Initial Consult    General Information  Assessment completed with: Patient, VM-chart review,    Type of CM/SW Visit: Initial Assessment    Primary Care Provider verified and updated as needed: Yes   Readmission within the last 30 days: no previous admission in last 30 days      Reason for Consult: discharge planning  Advance Care Planning: Advance Care Planning Reviewed: other (see comments) (none)          Communication Assessment  Patient's communication style: spoken language (English or Bilingual)    Hearing Difficulty or Deaf: no   Wear Glasses or Blind: yes    Cognitive  Cognitive/Neuro/Behavioral: WDL                      Living Environment:   People in home: spouse   Current living Arrangements: house (2 levels)      Able to return to prior arrangements: yes (assists with her Dad whom is in a care facility and [facility]     Family/Social Support:  Care provided by: self  Provides care for: other (see comments)  Marital Status:   , Children          Description of Support System: Supportive, Involved       Current Resources:   Patient receiving home care services: No     Community Resources: None  Equipment currently used at home: none (access to a wheelchair if needed)  Supplies currently used at home:  None    Employment/Financial:  Employment Status: homemaker, retired        Financial Concerns: No concerns identified   Referral to Financial Worker: No       Lifestyle & Psychosocial Needs:  Social Determinants of Health     Tobacco Use: Low Risk      Smoking Tobacco Use: Never Smoker     Smokeless Tobacco Use: Never Used   Alcohol Use: Not on file   Financial Resource Strain: Not on file   Food Insecurity: Not on file   Transportation Needs: Not on file   Physical Activity: Not on file   Stress: Not on file   Social Connections: Not on file   Intimate Partner Violence: Not on file   Depression: Not on file   Housing Stability: Not on  file       Functional Status:  Prior to admission patient needed assistance: see therapy evaluations. Patient states independent prior to her fall, enjoyed gardening and did many activities.             Mental Health Status:  Mental Health Status: No Current Concerns       Chemical Dependency Status: No concerns voiced                Values/Beliefs:  Spiritual, Cultural Beliefs, Latter-day Practices, Values that affect care: no               Additional Information:  Met with patient today as per MD notes anticipated home tomorrow with [facility]. Patient agreeable with the recommendation and did not have agency preference. [facility] called and I spoke to Bessie at [phone]. [facility] is able to accept the referral for start of care Wednesday, March 2nd. Preliminary information sent to Bessie at [facility] at fax number [phone]. Case Management will follow along for discharge. Richie walker for home can be dispensed from the hospital after discuss with therapies.       Hoda [place] RN   [facility]   Phone [phone]

## 2022-02-27 NOTE — PROGRESS NOTES
[facility]    Medicine Progress Note - Hospitalist Service    Date of Admission:  2/24/2022    Assessment & Plan        Ericka Frias is a 72 year old female who presented to the ER following a fall which results in shoulder and knee pain. She was found to have a right shoulder dislocation and right patella fracture. The hospitalist service was contacted to admit her for further evaluation and management.    Right patella fracture  Right shoulder dislocation  History of right TKA ~10 years ago  She was walking outside a restaurant when she had a mechanical fall while descending stair. She landed on R side injuring knee and shoulder. ? Head trauma. No LOC. Vitals stable on admission, mildly hypertensive. Imaging with dislocation of R shoulder. Patellar fracture. No hip fracture. CT head normal.   - Orthopedic surgery consult appreciated. Pt went to OR for ORIF 2/25  - EKG OK. Labs OK except mildly elevated WBC as noted below.  - As needed pain medications available.  - She is normally pretty active at baseline and denies any chest pain or shortness of breath with activity. No further work-up/intervention needed from a hospitalist standpoint prior to proceeding with surgery.  - knee brace fitted with orthotics yesterday  - continue shoulder brace  - discharge orders placed for tomorrow am. Patient to work with PT early in the am with daughter present so daughter can get some instruction on home cares/transfers. Med rec done and 2 rxs to be filled in our pharmacy. Home PT ordered.    Leukocytosis  Suspect reactive. Afebrile. UA Negative.  - Resolved    Hypertension   - Continue PTA amlodipine 10 mg daily.  - Hold PTA losartan 25 mg daily for now, reassess on PCP follow up    Anxiety  - Continue PTA escitalopram 20 mg daily and xanax 0.25 nightly PRN.    GERD  - Continue PTA PRN famotidine.    COVID-19 PCR negative  - Routine admission COVID-19 PCR testing was negative on 2/24/22.       Diet:  "Advance Diet as Tolerated: Regular Diet Adult  Diet    DVT Prophylaxis: Pneumatic Compression Devices  Raphael Catheter: Not present  Central Lines: None  Cardiac Monitoring: None  Code Status: Full Code      Disposition Plan   Expected Discharge: 02/27/2022     Anticipated discharge location:  Awaiting care coordination huddle  Delays:     Other (Add Comment)          The patient's care was discussed with the Bedside Nurse and Patient.    Lesli Madison MD  Hospitalist Service  [facility]  Securely message with the Vocera Web Console (learn more here)  Text page via [employer] Paging/Directory         Clinically Significant Risk Factors Present on Admission              # Overweight: Estimated body mass index is 29.15 kg/m  as calculated from the following:    Height as of this encounter: 1.578 m (5' 2.13\").    Weight as of this encounter: 72.6 kg (160 lb).      ______________________________________________________________________    Interval History   Patient doing relatively well. Pain controlled but she has nausea when trying to work with PT - session this morning was difficult. We agreed to schedule a dose of zofran early tomorrow morning and she would like some zofran on discharge. Daughter will come tomorrow morning to get training from PT then take pt home. PT is currently scheduled for 7:30am.    Data reviewed today: I reviewed all medications, new labs and imaging results over the last 24 hours. I personally reviewed no images or EKG's today.    Physical Exam   Vital Signs: Temp: 98.8  F (37.1  C) Temp src: Oral BP: 128/52 Pulse: 73   Resp: 16 SpO2: 96 % O2 Device: None (Room air)    Weight: 160 lbs 0 oz  Constitutional: awake, alert, cooperative, no apparent distress, laying in the hospital bed  Respiratory: clear to auscultation bilaterally, no crackles or wheezing  Cardiovascular: regular rate and rhythm, normal S1 and S2, no murmur noted  GI: normal bowel sounds, soft, " non-distended, non-tender  Skin: warm, dry  Musculoskeletal: no lower extremity pitting edema present, right arm in sling, right leg in brace  Neurologic: awake, alert, answers questions appropriately, moves all extremities    Data   Recent Labs   Lab 02/27/22  0653 02/27/22  0602 02/26/22  0751 02/26/22  0656 02/24/22  2220   WBC  --   --  8.4  --  15.0*   HGB 10.8*  --  11.1*  --  12.5   MCV  --   --  93  --  91   PLT  --   --  197  --  233   NA  --   --  137  --  135   POTASSIUM  --   --  4.3  --  3.6   CHLORIDE  --   --  108  --  107   CO2  --   --  26  --  23   BUN  --   --  13  --  18   CR  --   --  0.70  --  0.63   ANIONGAP  --   --  3  --  5   KACI  --   --  8.6  --  8.3*   GLC  --  104* 114* 116* 183*     No results found for this or any previous visit (from the past 24 hour(s)).  Medications     lactated ringers Stopped (02/26/22 0425)     sodium chloride 100 mL/hr at 02/25/22 1050       acetaminophen  975 mg Oral Q8H     amLODIPine  10 mg Oral Daily     aspirin  325 mg Oral Daily     calcium carbonate-vitamin D  1 tablet Oral Daily     escitalopram  20 mg Oral Daily     folic acid  400 mcg Oral Daily     multivitamin w/minerals  1 tablet Oral Daily     [START ON 2/28/2022] ondansetron  4 mg Intravenous Once     polyethylene glycol  17 g Oral Daily     senna-docusate  1 tablet Oral BID     sodium chloride (PF)  3 mL Intracatheter Q8H     vitamin D3  50 mcg Oral Daily     vitamin E  400 Units Oral Daily

## 2022-02-27 NOTE — PROGRESS NOTES
Patient vital signs are at baseline: Yes  Patient able to ambulate as they were prior to admission or with assist devices provided by therapies during their stay:  Yes  Patient MUST void prior to discharge:  Yes  Patient able to tolerate oral intake:  Yes  Pain has adequate pain control using Oral analgesics:  Yes    Pt is alert and oriented x4, VSS. CMS intact. Pt up to bedside commode/ using bedpan with 1 assist. Voiding without difficulty. Pt knee immobilizer in place. Dressing CDI Pt taking oxycodone approximately every 5 hours for pain, reports adequate pain control. Continue POC

## 2022-02-27 NOTE — PROGRESS NOTES
"   02/27/22 1300   Quick Adds   Type of Visit Initial Occupational Therapy Evaluation   Living Environment   People in Home spouse   Current Living Arrangements house   Home Accessibility stairs to enter home;stairs within home   Number of Stairs, Main Entrance 2   Stair Railings, Main Entrance none   Number of Stairs, Within Home, Primary other (see comments)  (full flight upstairs and to basement )   Stair Railings, Within Home, Primary railing on left side (ascending)   Transportation Anticipated family or friend will provide   Living Environment Comments 2 story house and basement with . Laundry, bathroom, kitchen on main floor; can sleep on main floor for time being but bedroom is upstairs.  is home 24/7 but has multiple mylenoma and limited lifting ability. Walk in shower upstairs and in basement.    Self-Care   Usual Activity Tolerance good   Current Activity Tolerance fair   Regular Exercise No   Equipment Currently Used at Home none  (access to a wheelchair)   Fall history within last six months yes   Number of times patient has fallen within last six months 1   Activity/Exercise/Self-Care Comment IND with all ADLs/IADLs at baseline, pt will have spouse available for 24/7 assist but he has hx of cancer and likely unable to physically assist safely     Instrumental Activities of Daily Living (IADL)   IADL Comments Pt was out to dinner w/ family when she fell, completes all IADLs.    General Information   Onset of Illness/Injury or Date of Surgery 02/24/22   Referring Physician Catie Calderón PA-C   Patient/Family Therapy Goal Statement (OT) \"to go home and to not go anywhere for a little while because I know it is going to be very challenging\"    Additional Occupational Profile Info/Pertinent History of Current Problem Per chart: \"Ericka Frias is a 72 year old female who presented to the ER following a fall which results in shoulder and knee pain. She was found to have a right " "shoulder dislocation and right patella fracture. The hospitalist service was contacted to admit her for further evaluation and management.\"   Existing Precautions/Restrictions brace worn when out of bed;shoulder   Right Upper Extremity (Weight-bearing Status) non weight-bearing (NWB)   Right Lower Extremity (Weight-bearing Status) weight-bearing as tolerated (WBAT)   Cognitive Status Examination   Cognitive Status Comments Pt appears to be WFL, some difficulty w/ higher level problem solving skills.    Visual Perception   Impact of Vision Impairment on Function (Vision) glasses    Sensory   Sensory Comments increased pain in RUE w/ any movement   Integumentary/Edema   Integumentary/Edema Comments encouraged to complete fist pumps and ROM    Range of Motion Comprehensive   Comment, General Range of Motion LUE WFL, RUE limited by immobilizer brace   Strength Comprehensive (MMT)   Comment, General Manual Muscle Testing (MMT) Assessment LUE WFL, RUE unable to assess    Bed Mobility   Comment (Bed Mobility) SBA, modified IND w/ Knee extension brace at all times, shoulder immobilizer on at all times - supine > sit EOB w/o use of bed rails   Transfers   Transfer Comments SBA STS   Clinical Impression   Criteria for Skilled Therapeutic Interventions Met (OT) Yes, treatment indicated   OT Diagnosis impaired ADL/IADL    OT Problem List-Impairments impacting ADL problems related to;activity tolerance impaired;strength;post-surgical precautions;pain;mobility   Assessment of Occupational Performance 5 or more Performance Deficits   Identified Performance Deficits dressing, bathing, home mgmt, activity tolerance    Planned Therapy Interventions (OT) ADL retraining;IADL retraining;cognition;neuromuscular re-education;ROM;strengthening;transfer training;home program guidelines;progressive activity/exercise   Clinical Decision Making Complexity (OT) moderate complexity   Risk & Benefits of therapy have been explained " evaluation/treatment results reviewed;care plan/treatment goals reviewed;risks/benefits reviewed;current/potential barriers reviewed;participants voiced agreement with care plan;participants included;patient   OT Discharge Planning   OT Discharge Recommendation (DC Rec) home with home care occupational therapy;home with assist  (defer to ortho POC)   OT Rationale for DC Rec Pt functioning significantly below baseline would benefit from continued IP OT to progress functional transfers (toilet tr) and dressing strategies.    OT Brief overview of current status SBA-CGA for most transfers but Min A for toilet tr d/t home set up at this time.    Total Evaluation Time (Minutes)   Total Evaluation Time (Minutes) 8   OT Goals   Therapy Frequency (OT) Daily   OT Predicated Duration/Target Date for Goal Attainment 03/03/22   OT Goals Upper Body Dressing;Lower Body Dressing;Toilet Transfer/Toileting   OT: Upper Body Dressing Modified independent;Minimal assist   OT: Lower Body Dressing Minimal assist   OT: Toilet Transfer/Toileting Modified independent;toilet transfer;cleaning and garment management;using adaptive equipment;within precautions

## 2022-02-28 ENCOUNTER — APPOINTMENT (OUTPATIENT)
Dept: PHYSICAL THERAPY | Facility: CLINIC | Age: 72
DRG: 516 | End: 2022-02-28
Payer: MEDICARE

## 2022-02-28 ENCOUNTER — APPOINTMENT (OUTPATIENT)
Dept: OCCUPATIONAL THERAPY | Facility: CLINIC | Age: 72
DRG: 516 | End: 2022-02-28
Payer: MEDICARE

## 2022-02-28 VITALS
HEIGHT: 62 IN | HEART RATE: 79 BPM | OXYGEN SATURATION: 95 % | TEMPERATURE: 99 F | DIASTOLIC BLOOD PRESSURE: 66 MMHG | BODY MASS INDEX: 29.44 KG/M2 | WEIGHT: 160 LBS | SYSTOLIC BLOOD PRESSURE: 138 MMHG | RESPIRATION RATE: 16 BRPM

## 2022-02-28 PROCEDURE — 250N000011 HC RX IP 250 OP 636: Performed by: PHYSICIAN ASSISTANT

## 2022-02-28 PROCEDURE — 97116 GAIT TRAINING THERAPY: CPT | Mod: GP | Performed by: PHYSICAL THERAPY ASSISTANT

## 2022-02-28 PROCEDURE — 250N000013 HC RX MED GY IP 250 OP 250 PS 637: Performed by: PHYSICIAN ASSISTANT

## 2022-02-28 PROCEDURE — 97530 THERAPEUTIC ACTIVITIES: CPT | Mod: GP | Performed by: PHYSICAL THERAPY ASSISTANT

## 2022-02-28 PROCEDURE — 99239 HOSP IP/OBS DSCHRG MGMT >30: CPT | Performed by: HOSPITALIST

## 2022-02-28 PROCEDURE — 97535 SELF CARE MNGMENT TRAINING: CPT | Mod: GO

## 2022-02-28 RX ADMIN — ACETAMINOPHEN 975 MG: 325 TABLET, FILM COATED ORAL at 03:06

## 2022-02-28 RX ADMIN — OXYCODONE HYDROCHLORIDE 5 MG: 5 TABLET ORAL at 11:45

## 2022-02-28 RX ADMIN — PROCHLORPERAZINE MALEATE 5 MG: 5 TABLET ORAL at 09:33

## 2022-02-28 RX ADMIN — ACETAMINOPHEN 975 MG: 325 TABLET, FILM COATED ORAL at 08:13

## 2022-02-28 RX ADMIN — AMLODIPINE BESYLATE 10 MG: 10 TABLET ORAL at 08:14

## 2022-02-28 RX ADMIN — OXYCODONE HYDROCHLORIDE 5 MG: 5 TABLET ORAL at 03:05

## 2022-02-28 RX ADMIN — Medication 1 TABLET: at 08:13

## 2022-02-28 RX ADMIN — ESCITALOPRAM OXALATE 20 MG: 20 TABLET ORAL at 08:14

## 2022-02-28 RX ADMIN — ASPIRIN 325 MG: 325 TABLET, COATED ORAL at 08:13

## 2022-02-28 RX ADMIN — OXYCODONE HYDROCHLORIDE 5 MG: 5 TABLET ORAL at 07:04

## 2022-02-28 RX ADMIN — Medication 50 MCG: at 08:13

## 2022-02-28 RX ADMIN — ONDANSETRON 4 MG: 4 TABLET, ORALLY DISINTEGRATING ORAL at 07:04

## 2022-02-28 ASSESSMENT — ACTIVITIES OF DAILY LIVING (ADL)
ADLS_ACUITY_SCORE: 16

## 2022-02-28 NOTE — PLAN OF CARE
Goal Outcome Evaluation:    Plan of Care Reviewed With: patient     Outcome Evaluation: anticipated discharge on Monday, to home with Home PT per MD notes.  Patient vital signs are at baseline: Yes  Patient able to ambulate as they were prior to admission or with assist devices provided by therapies during their stay:  Yes  Patient MUST void prior to discharge:  Yes  Patient able to tolerate oral intake:  Yes  Pain has adequate pain control using Oral analgesics:  Yes, pt somewhat adamant about receiving meds every 4 hours and timing it for therapy in the morning.   Pt wants to have therapy after 0800 when daughter can be here.

## 2022-02-28 NOTE — PROGRESS NOTES
Patient likes to eat and takes oxycodone prior to discharge due to pain and nausea. Oxycodone is not available after 1105.

## 2022-02-28 NOTE — PROVIDER NOTIFICATION
Paged hospitalist: patient is wondering if she can take Zofran more frequently or can she get other alternatives. pt experienced an episode of nausea with PT. Awaiting for call back.

## 2022-02-28 NOTE — PLAN OF CARE
Goal Outcome Evaluation      Patient is A&O x4. Up with one assist/gb/edwin-walker to bathroom, voiding. Denies chest pain or SOB. Pain managed with oxycodone and tylenol. IV access discontinued. Patient continued to have intermittent nausea when OOB, managed with Zofran and compazine. Dressing CDI, hinge brace in place on RLE. Immobilizer in place on RUE. Reviewed AVS with patient and daughter. No further questions asked. Discharge home with medications and belonging.

## 2022-02-28 NOTE — PLAN OF CARE
Patient is discharging home today with assist of daughter. Patient will have home care physical therapy. Patient has met most goals.

## 2022-02-28 NOTE — PROGRESS NOTES
Patient vital signs are at baseline: Yes  Patient able to ambulate as they were prior to admission or with assist devices provided by therapies during their stay:  Yes, progressing, brace om at all times. Up with 1 assist.   Patient MUST void prior to discharge:  Yes   Patient able to tolerate oral intake:  Yes  Pain has adequate pain control using Oral analgesics:  Yes     Pt is alert and oriented x4, VSS. CMS intact. Pt up to bedside commode/ using bedpan with 1 assist. Voiding without difficulty. Pt knee immobilizer in place. Dressing CDI. Pt taking oxycodone approximately every 4-5 hours for pain, reports adequate pain control. Would like oxycodone to be given before PT this AM, timed appropriately throughout night. Continue to monitor for pain. Continue POC

## 2022-02-28 NOTE — PROGRESS NOTES
Orthopedic Surgery  Ericka Frias  2022  Admit Date:  2022  POD: 3 Days Post-Op   Procedure(s):  OPEN REDUCTION INTERNAL FIXATION, FRACTURE, PATELLA    Alert and oriented.   Patient resting comfortably in bed.    Pain controlled.  Tolerating oral intake.    Denies nausea or vomiting  Denies chest pain or shortness of breath    Vital Sign Ranges  Temperature Temp  Av.5  F (36.9  C)  Min: 98.2  F (36.8  C)  Max: 99  F (37.2  C)   Blood pressure Systolic (24hrs), Av , Min:134 , Max:142        Diastolic (24hrs), Av, Min:59, Max:71      Pulse Pulse  Av.7  Min: 76  Max: 79   Respirations Resp  Av  Min: 16  Max: 16   Pulse oximetry SpO2  Av %  Min: 95 %  Max: 95 %       Dressing/Ace is clean, dry, and intact. Hinged knee brace in place, locked.    Bilateral calves are soft, non-tender.  Shoulder immobilizer in place.   Sensation and motor of fingers intact. Radial pulse intact.   Right lower extremity is NVI.  Dorsiflexion and plantarflexion intact.        Labs:  Recent Labs   Lab Test 22  0751 22  2220   WBC 8.4 15.0*     Recent Labs   Lab Test 22  0653 22  0751 22  2220   HGB 10.8* 11.1* 12.5     No results for input(s): INR in the last [id number] hours.  Recent Labs   Lab Test 22  0751 22  2220    233     A/P  1. S/p ORIF Perioprosthetic patella fracture,   S/p right native shoulder dislocation with reduction in ED              Activity: WBAT in locked in extension hinged knee brace              Continue with shoulder immobilizer. NWB RUE.  Ok to range elbow and gentle codmans with right shoulder.               Continue ASA for DVT prophylaxis.                Mobilize with PT/OT              Continue current pain regimen.     2. Disposition              Anticipate discharge to home today pending progress with PT and medical stability.  Ortho stable.     Fatemeh Wagner PA-C

## 2022-02-28 NOTE — PLAN OF CARE
Occupational Therapy Discharge Summary    Reason for therapy discharge:    All goals and outcomes met, no further needs identified.    Progress towards therapy goal(s). See goals on [facility] Plan in [facility] record for goal details.  Goals met    Therapy recommendation(s):    Continued therapy is recommended.  Rationale/Recommendations:  Pt would benefit from HHOT eval as well as HHPT to continue to progress functional independence as able within precautions and ortho recs; use of edwin walker for mobility; assist with dressing - plan is to have family assist or wear hospital/pajama gown once discharged; it would be a significant effort for pt to leave the home; sponge bathe until LE and UE brace(s) can be removed.

## 2022-02-28 NOTE — PROGRESS NOTES
Care Management Discharge Note    Discharge Date: 02/28/2022       Discharge Disposition: Home, Home Care    Discharge Services: None    Discharge DME: Walker (edwin walker)    Discharge Transportation: family or friend will provide    Private pay costs discussed: Not applicable    PAS Confirmation Code:    Patient/family educated on Medicare website which has current facility and service quality ratings:      Education Provided on the Discharge Plan:    Persons Notified of Discharge Plans: Charge RN Shameka  Patient/Family in Agreement with the Plan: yes    Handoff Referral Completed: Yes    Additional Information:  Discharge plans in progress.  Writer tried to call patient in her room as well as her mobile number and did not get an answer.Called Shameka charge RN to let her know [facility] can take patient for home PT-as arranged by Nena Guthrie.  Writer added the info to AVS and faxed orders to; [phone]-[facility] [place] RN

## 2022-02-28 NOTE — DISCHARGE SUMMARY
[facility]  Hospitalist Discharge Summary      Date of Admission:  2/24/2022  Date of Discharge:  2/28/2022 12:49 PM  Discharging Provider: Rc Robison MD  Discharge Service: Hospitalist Service    Discharge Diagnoses     Right patella fracture status ORIF on 2/25   Anterior dislocation of the right glenohumeral joint status reduction    Follow-ups Needed After Discharge   Follow-up Appointments     Follow-up and recommended labs and tests      Follow-up with Dr Smith/Catie KELLOGG at [facility] 2 weeks following your   surgery.   To arrange appointment or questions call: the care coordinator at   [phone]  [facility] Viraj phone number: [phone]  [facility] Rosalie phone number: [phone]         Follow-up and recommended labs and tests       Follow up with primary care provider, Theresa Mcginnis MD, within 7 days for   hospital follow- up.  No follow up labs or test are needed. Follow up with   orthopedics as directed.         {Additional follow-up instructions/to-do's for PCP    :    Unresulted Labs Ordered in the Past 30 Days of this Admission     No orders found from 1/25/2022 to 2/25/2022.      These results will be followed up by     Discharge Disposition   Discharged to home  Condition at discharge: Stable        Hospital Course     This is a 72-year-old female with medical history which includes hypertension, anxiety, GERD who presented to the hospital after mechanical fall and she had pain over the right shoulder and right patella and was found to have dislocation of the right shoulder along with patella fracture and orthopedics was consulted.  Patient underwent open reduction and internal fixation on 2/25 and has been working with physical therapy and Occupational Therapy and her postoperative course was unremarkable except from some nausea when she takes pain medication.  She had mild leukocytosis which resolved and was due to stress.  She also underwent reduction of the  shoulder dislocation and tolerated the procedure well    Her home medications were continued apart from losartan which was held as her blood pressure was pretty well controlled with amlodipine and she will need to discuss stone with her PCP for need to continue losartan and she is aware of the same    Physical therapy, Occupational Therapy and care coordinator team was consulted for discharge planning and patient will benefit from home care and I saw her on the day of discharge and her daughter was present and patient and daughter are in agreement with going home and she will follow with her primary care as an outpatient and also follow-up with orthopedics.  Patient also gets nausea after taking pain medications and was prescribed Zofran and can take extra dose of Zofran for a maximal dose of 24 mg and if she continues to have ongoing nausea and the next 1 to 2 days then she will need to follow-up with her primary care physician for further work-up    Consultations This Hospital Stay   ORTHOPEDIC SURGERY IP CONSULT  PHYSICAL THERAPY ADULT IP CONSULT  OCCUPATIONAL THERAPY ADULT IP CONSULT  ORTHOSIS EXTREMITY LOWER REFERRAL IP CONSULT  CARE MANAGEMENT / SOCIAL WORK IP CONSULT    Code Status   Prior    Time Spent on this Encounter   I, Rc Robison MD, personally saw the patient today and spent greater than 30 minutes discharging this patient.       Rc Robison MD  [facility]  [address] [place] [id number]  Phone: [phone]  Fax: [phone]  ______________________________________________________________________    Physical Exam   Vital Signs: Temp: 99  F (37.2  C) Temp src: Oral BP: 138/66 Pulse: 79   Resp: 16 SpO2: 95 % O2 Device: None (Room air)    Weight: 160 lbs 0 oz        General: Patient appears comfortable and in no acute distress.  HEENT: Head is atraumatic, normocephalic.  Pupils are equal, round and reactive to light.  No scleral icterus.  Respiratory: Lungs  are clear to auscultation bilaterally. No wheezes, rhonchi bilaterally.  Cardiovascular: Regular rate and rhythm.  Normal S1 and S2.  No murmurs, rubs, or gallops.  No edema over the lower extremities  Abdomen:   Soft, nontender, nondistended and bowel sounds present  Skin: No skin rashes or lesions to inspection or palpation.  Neurologic: Higher functions are within normal limits. No obvious defects in speech, language and memory.   Musculoskeletal: I was not able to assess her gait as she was using wheelchair  Psychiatric: Cooperative       Primary Care Physician   Adriana Jeff    Discharge Orders      Home Care Referral      Reason for your hospital stay    Patellar fracture and shoulder dislocation     Follow-up and recommended labs and tests     Follow up with primary care provider, Theresa Mcginnis MD, within 7 days for hospital follow- up.  No follow up labs or test are needed. Follow up with orthopedics as directed.     Activity    Your activity upon discharge: activity as tolerated     Reason for your hospital stay    Right patella fracture  Right shoulder dislocation     Follow-up and recommended labs and tests    Follow-up with Dr Smith/Catie KELLOGG at [facility] 2 weeks following your surgery.   To arrange appointment or questions call: the care coordinator at [phone]  [facility] phone number: [phone]  [facility] phone number: [phone]     Activity    Your activity upon discharge: WBAT right LE with hinged knee brace locked at applied at all times    Shoulder: keep immobilizer applied.  Ok to range elbow as tolerated.  Gentle codman exercises okay right shoulder, then re-apply brace.     When to contact your care team    Call your provider if you have any of the following: temperature greater than 101,  increased shortness of breath, increased drainage, redness or increasing calf pain/cramping.     Wound care and dressings    Instructions to care for your wound at home: Keep wound  clean and dry.  Keep incision covered.   Do not immerse.     Crutches DME    DME Documentation: Describe the reason for need to support medical necessity: Impaired gait status post knee surgery. I, the undersigned, certify that the above prescribed supplies are medically necessary for this patient and is both reasonable and necessary in reference to accepted standards of medical practice in the treatment of this patient's condition and is not prescribed as a convenience.     Cane DME    DME Documentation: Describe the reason for need to support medical necessity: Impaired gait status post knee surgery. I, the undersigned, certify that the above prescribed supplies are medically necessary for this patient and is both reasonable and necessary in reference to accepted standards of medical practice in the treatment of this patient's condition and is not prescribed as a convenience.     Walker DME    DME Documentation: Describe the reason for need to support medical necessity: Impaired gait status post knee surgery. I, the undersigned, certify that the above prescribed supplies are medically necessary for this patient and is both reasonable and necessary in reference to accepted standards of medical practice in the treatment of this patient's condition and is not prescribed as a convenience.     Diet    Follow this diet upon discharge: Orders Placed This Encounter      Advance Diet as Tolerated: Regular Diet Adult       Significant Results and Procedures   Most Recent 3 CBC's:Recent Labs   Lab Test 02/27/22  0653 02/26/22  0751 02/24/22  2220   WBC  --  8.4 15.0*   HGB 10.8* 11.1* 12.5   MCV  --  93 91   PLT  --  197 233     Most Recent 3 BMP's:Recent Labs   Lab Test 02/27/22  0602 02/26/22  0751 02/26/22  0656 02/24/22  2220   NA  --  137  --  135   POTASSIUM  --  4.3  --  3.6   CHLORIDE  --  108  --  107   CO2  --  26  --  23   BUN  --  13  --  18   CR  --  0.70  --  0.63   ANIONGAP  --  3  --  5   KACI  --  8.6  --   8.3*   * 114* 116* 183*   ,   Results for orders placed or performed during the hospital encounter of 02/24/22   CT Head w/o Contrast    Narrative    EXAM: CT HEAD W/O CONTRAST  LOCATION: [facility]  DATE/TIME: 2/24/2022 7:56 PM    INDICATION: Head trauma, minor (Age >= 65y), pain.  COMPARISON: None.  TECHNIQUE: Routine CT Head without IV contrast. Multiplanar reformats. Dose reduction techniques were used.    FINDINGS:  INTRACRANIAL CONTENTS: No intracranial hemorrhage, extraaxial collection, or mass effect.  No CT evidence of acute infarct. Normal parenchymal attenuation. Normal ventricles and sulci.     VISUALIZED ORBITS/SINUSES/MASTOIDS: No intraorbital abnormality. No paranasal sinus mucosal disease. No middle ear or mastoid effusion.    BONES/SOFT TISSUES: No acute abnormality.      Impression    IMPRESSION:  1.  Normal head CT.   XR Shoulder Right G/E 3 Views    Narrative    EXAM: XR SHOULDER RIGHT G/E 3 VIEWS  LOCATION: [facility]  DATE/TIME: 2/24/2022 8:13 PM    INDICATION: Fall, pain  COMPARISON: None.      Impression    IMPRESSION: Anterior dislocation of the right glenohumeral joint. No fractures are identified but repeat films following reduction recommended. The right AC joint is negative.    XR Knee Right 3 Views    Narrative    EXAM: XR KNEE RIGHT 3 VIEWS  LOCATION: [facility]  DATE/TIME: 2/24/2022 8:13 PM    INDICATION: Fall, pain  COMPARISON: None.      Impression    IMPRESSION: Postoperative changes of a right total knee arthroplasty. There is a fracture through the midportion of the patella with slight distraction between the superior and inferior poles. Small effusion. Prepatellar edema.   XR Pelvis w Hip Right 1 View    Narrative    EXAM: XR PELVIS AND HIP RIGHT 1 VIEW  LOCATION: [facility]  DATE/TIME: 2/24/2022 8:13 PM    INDICATION: fall, pain  COMPARISON: None.      Impression     IMPRESSION: Both hips negative for fracture or dislocation. Pelvis negative for fracture.   XR Shoulder Right Port 1 View    Narrative    EXAM: XR SHOULDER RIGHT PORT 1 VIEW  LOCATION: [facility]  DATE/TIME: 2/24/2022 8:55 PM    INDICATION: post reduction  COMPARISON: Earlier today      Impression    IMPRESSION: Unchanged anterior dislocation of the humeral head relative to the glenoid. No definite fracture is identified.   XR Shoulder Right Port G/E 2 Views    Narrative    EXAM: XR SHOULDER RIGHT PORT G/E 2 VIEWS  LOCATION: [facility]  DATE/TIME: 2/24/2022 10:20 PM    INDICATION: Post reduction.  COMPARISON: 02/24/2022      Impression    IMPRESSION: Previously seen anteroinferior right shoulder dislocation has been reduced to anatomic alignment.    XR Surgery VASYL L/T 5 Min Fluoro w Stills    Narrative    EXAM: XR SURGERY C-ARM FLUORO LESS THAN 5 MIN W STILLS  LOCATION: [facility]  DATE/TIME: 2/25/2022 5:30 PM    INDICATION: ORIF right patella  COMPARISON: None.  TECHNIQUE: Exam performed by surgeon.    FINDINGS:    AP and lateral projection of the right knee shows anatomic alignment of total knee arthroplasty.     FLUOROSCOPIC TIME: 0.4  NUMBER OF IMAGES: 2      Impression    IMPRESSION:  1.  Fluoroscopic guidance provided for assessment of right knee total arthroplasty showing anatomic alignment.       Discharge Medications   Discharge Medication List as of 2/28/2022 11:52 AM      START taking these medications    Details   acetaminophen (TYLENOL) 325 MG tablet Take 2 tablets (650 mg) by mouth every 6 hours as needed for mild pain or other (and adjunct with moderate or severe pain or per patient request), OTC      aspirin (ASA) 325 MG EC tablet Take 1 tablet (325 mg) by mouth daily, Disp-30 tablet, R-1, Local Print      ondansetron (ZOFRAN-ODT) 4 MG ODT tab Take 1 tablet (4 mg) by mouth every 6 hours as needed for nausea or vomiting,  Disp-12 tablet, R-0, Local Print      oxyCODONE (ROXICODONE) 5 MG tablet Take 1-2 tablets (5-10 mg) by mouth every 4 hours as needed for pain, Disp-25 tablet, R-0, Local Print         CONTINUE these medications which have NOT CHANGED    Details   ALPRAZolam (XANAX) 0.25 MG tablet Take 0.25 mg by mouth nightly as needed, Historical      amLODIPine (NORVASC) 10 MG tablet Take 10 mg by mouth daily, Historical      calcium-vitamin D (OSCAL) 250-125 MG-UNIT TABS per tablet Take 1 tablet by mouth daily, Historical      escitalopram (LEXAPRO) 20 MG tablet Take 20 mg by mouth daily, Historical      famotidine (PEPCID) 20 MG tablet Take 20 mg by mouth 2 times daily as needed for heartburn, Historical      folic acid (FOLVITE) 400 MCG tablet Take 400 mcg by mouth daily, Historical      multivitamin w/minerals (THERA-VIT-M) tablet Take 1 tablet by mouth daily, Historical      naproxen (NAPROSYN) 250 MG tablet Take 250 mg by mouth 2 times daily as needed for moderate pain, Historical      vitamin D3 (CHOLECALCIFEROL) 50 mcg (2000 units) tablet Take 1 tablet by mouth daily, Historical      vitamin E (TOCOPHEROL) 400 units (180 mg) capsule Take 400 Units by mouth daily, Historical         STOP taking these medications       losartan (COZAAR) 25 MG tablet Comments:   Reason for Stopping:             Allergies   Allergies   Allergen Reactions     Food      Sesame seeds,bumps/throat swelling     Lisinopril Cough

## 2022-03-01 ENCOUNTER — PATIENT OUTREACH (OUTPATIENT)
Dept: CARE COORDINATION | Facility: CLINIC | Age: 72
End: 2022-03-01
Payer: MEDICARE

## 2022-03-01 DIAGNOSIS — Z71.89 OTHER SPECIFIED COUNSELING: ICD-10-CM

## 2022-03-01 LAB
ATRIAL RATE - MUSE: 69 BPM
DIASTOLIC BLOOD PRESSURE - MUSE: NORMAL MMHG
INTERPRETATION ECG - MUSE: NORMAL
P AXIS - MUSE: 49 DEGREES
PR INTERVAL - MUSE: 178 MS
QRS DURATION - MUSE: 80 MS
QT - MUSE: 382 MS
QTC - MUSE: 409 MS
R AXIS - MUSE: 59 DEGREES
SYSTOLIC BLOOD PRESSURE - MUSE: NORMAL MMHG
T AXIS - MUSE: 71 DEGREES
VENTRICULAR RATE- MUSE: 69 BPM

## 2022-03-01 NOTE — PROGRESS NOTES
Clinic Care Coordination Contact  [facility]/Voicemail       Clinical Data: Care Coordinator Outreach  Outreach attempted x 1.  Left message on patient's voicemail with call back information and requested return call.  Plan: Care Coordinator will try to reach patient again in 1-2 business days.    Spring Sethi  Community Health Worker  [facility]  [phone]

## 2022-03-02 NOTE — PROGRESS NOTES
"Clinic Care Coordination Contact  [facility]: Post-Discharge Note  SITUATION                                                      Admission:    Admission Date: 03/24/22   Reason for Admission: Patellar fracture and shoulder dislocation  Discharge:   Discharge Date: 03/28/22  Discharge Diagnosis: Patellar fracture and shoulder dislocation    BACKGROUND                                                      Per hospital discharge summary and inpatient provider notes:    Ericka Frias is a 72 year old female who presents with pain after fall.  Patient was at dinner with family and was leaving the restaurant.  She went to step down a step when she hit a metal area on the ground.  There was ice and snow covering it so she did not see the ice.  She went down and fell on her right side.  She immediately had pain in her right shoulder and right knee.  She denied any palpitations, chest pain, dizziness or loss of consciousness.  She otherwise denies shortness of breath or abdominal pain.  She is generally very healthy and carries a history of hypertension and anxiety, and states her anxiety is on her excellent control.  She denies any fevers or chills.  She underwent reduction of her dislocated right shoulder with improvement in her pain.  Her right leg pain is okay when she is at rest as long she does not move it.    ASSESSMENT      Enrollment  Primary Care Care Coordination Status: Not a Candidate    Discharge Assessment  How are you doing now that you are home?: \"Doing pretty good over here\"  How are your symptoms? (Red Flag symptoms escalate to triage hotline per guidelines): Improved  Do you feel your condition is stable enough to be safe at home until your provider visit?: Yes  Does the patient have their discharge instructions? : Yes  Does the patient have questions regarding their discharge instructions? : No  Were you started on any new medications or were there changes to any of your previous medications? : " Yes  Does the patient have all of their medications?: Yes  Do you have questions regarding any of your medications? : No  Do you have all of your needed medical supplies or equipment (DME)?  (i.e. oxygen tank, CPAP, cane, etc.): Yes  Discharge follow-up appointment scheduled within 14 calendar days? : No  Is patient agreeable to assistance with scheduling? : No                  PLAN                                                      Outpatient Plan:  Follow-up with Dr Smith/Catie KELLOGG at [facility] 2 weeks following your   surgery.   To arrange appointment or questions call: the care coordinator at   [phone]  [facility] phone number: [phone]  [facility] phone number: [phone]         Follow-up and recommended labs and tests       Follow up with primary care provider, Theresa Mcginnis MD, within 7 days for   hospital follow- up.  No follow up labs or test are needed. Follow up with   orthopedics as directed.       No future appointments.      For any urgent concerns, please contact our 24 hour nurse triage line: [phone] ([address] [place] [address])         Spring Sethi  Community Health Worker  [facility]  [phone]

## 2022-04-22 ENCOUNTER — DOCUMENTATION ONLY (OUTPATIENT)
Dept: PHYSICAL THERAPY | Facility: CLINIC | Age: 72
End: 2022-04-22

## 2022-04-22 DIAGNOSIS — S43.004A: ICD-10-CM

## 2022-04-22 DIAGNOSIS — R26.89 OTHER ABNORMALITIES OF GAIT AND MOBILITY: Primary | ICD-10-CM

## 2022-04-22 DIAGNOSIS — S82.031A: ICD-10-CM

## (undated) DEVICE — PACK TOTAL KNEE SOP15TKFSD

## (undated) DEVICE — GOWN REINFORCED XXLG 9071

## (undated) DEVICE — IMM KNEE 24" 0814-2664

## (undated) DEVICE — DRAPE SHEET REV FOLD 3/4 9349

## (undated) DEVICE — BNDG ELASTIC 6" DBL LENGTH UNSTERILE 6611-16

## (undated) DEVICE — SU VICRYL 1 CTX CR 8X18" J765D

## (undated) DEVICE — SOL WATER IRRIG 1000ML BOTTLE 2F7114

## (undated) DEVICE — LINEN TOWEL PACK X5 5464

## (undated) DEVICE — WRAP EZY KNEE

## (undated) DEVICE — SU VICRYL 2-0 CP-1 27" UND J266H

## (undated) DEVICE — BLADE CLIPPER 4412A

## (undated) DEVICE — DRAPE STERI TOWEL LG 1010

## (undated) DEVICE — SU ETHILON 2-0 FS 18" 664G

## (undated) DEVICE — ESU GROUND PAD UNIVERSAL W/O CORD

## (undated) DEVICE — GLOVE PROTEXIS MICRO 8.5  2D73PM85

## (undated) DEVICE — SOL NACL 0.9% IRRIG 1000ML BOTTLE 2F7124

## (undated) DEVICE — MANIFOLD NEPTUNE 4 PORT 700-20

## (undated) DEVICE — GUIDE WIRE 1.25X150MM NON THRD  900.721

## (undated) DEVICE — DRILL BIT QUICK COUPLING CANN 2.7MM 310.67

## (undated) DEVICE — GLOVE PROTEXIS POWDER FREE 6.5 ORTHOPEDIC 2D73ET65

## (undated) DEVICE — PREP CHLORAPREP 26ML TINTED ORANGE  260815

## (undated) DEVICE — DRAPE C-ARM 60X42" 1013

## (undated) DEVICE — GLOVE PROTEXIS W/NEU-THERA 8.5  2D73TE85

## (undated) DEVICE — SU VICRYL 0 CP-1 27" J467H

## (undated) RX ORDER — FENTANYL CITRATE 50 UG/ML
INJECTION, SOLUTION INTRAMUSCULAR; INTRAVENOUS
Status: DISPENSED
Start: 2022-02-25

## (undated) RX ORDER — HYDROMORPHONE HCL IN WATER/PF 6 MG/30 ML
PATIENT CONTROLLED ANALGESIA SYRINGE INTRAVENOUS
Status: DISPENSED
Start: 2022-02-25

## (undated) RX ORDER — PROPOFOL 10 MG/ML
INJECTION, EMULSION INTRAVENOUS
Status: DISPENSED
Start: 2022-02-25

## (undated) RX ORDER — CEFAZOLIN SODIUM/WATER 2 G/20 ML
SYRINGE (ML) INTRAVENOUS
Status: DISPENSED
Start: 2022-02-25